# Patient Record
Sex: FEMALE | Employment: OTHER | URBAN - METROPOLITAN AREA
[De-identification: names, ages, dates, MRNs, and addresses within clinical notes are randomized per-mention and may not be internally consistent; named-entity substitution may affect disease eponyms.]

---

## 2018-05-13 ENCOUNTER — APPOINTMENT (OUTPATIENT)
Dept: GENERAL RADIOLOGY | Age: 83
End: 2018-05-13
Attending: PHYSICIAN ASSISTANT
Payer: MEDICARE

## 2018-05-13 ENCOUNTER — HOSPITAL ENCOUNTER (EMERGENCY)
Age: 83
Discharge: HOME | End: 2018-05-13
Attending: EMERGENCY MEDICINE
Payer: MEDICARE

## 2018-05-13 VITALS
SYSTOLIC BLOOD PRESSURE: 146 MMHG | BODY MASS INDEX: 17.4 KG/M2 | OXYGEN SATURATION: 100 % | TEMPERATURE: 97.6 F | HEIGHT: 60 IN | RESPIRATION RATE: 18 BRPM | WEIGHT: 88.63 LBS | HEART RATE: 79 BPM | DIASTOLIC BLOOD PRESSURE: 103 MMHG

## 2018-05-13 DIAGNOSIS — M47.817 LUMBAR AND SACRAL ARTHRITIS: Primary | ICD-10-CM

## 2018-05-13 DIAGNOSIS — M25.552 LEFT HIP PAIN: ICD-10-CM

## 2018-05-13 DIAGNOSIS — M16.10 HIP ARTHRITIS: ICD-10-CM

## 2018-05-13 PROCEDURE — 73502 X-RAY EXAM HIP UNI 2-3 VIEWS: CPT

## 2018-05-13 PROCEDURE — 74011250637 HC RX REV CODE- 250/637: Performed by: PHYSICIAN ASSISTANT

## 2018-05-13 PROCEDURE — 99283 EMERGENCY DEPT VISIT LOW MDM: CPT

## 2018-05-13 RX ORDER — HYDROCODONE BITARTRATE AND ACETAMINOPHEN 5; 325 MG/1; MG/1
1 TABLET ORAL
Status: COMPLETED | OUTPATIENT
Start: 2018-05-13 | End: 2018-05-13

## 2018-05-13 RX ORDER — HYDROCODONE BITARTRATE AND ACETAMINOPHEN 5; 325 MG/1; MG/1
1 TABLET ORAL
Qty: 12 TAB | Refills: 0 | Status: SHIPPED | OUTPATIENT
Start: 2018-05-13

## 2018-05-13 RX ADMIN — HYDROCODONE BITARTRATE AND ACETAMINOPHEN 1 TABLET: 5; 325 TABLET ORAL at 12:58

## 2018-05-13 NOTE — ED PROVIDER NOTES
EMERGENCY DEPARTMENT HISTORY AND PHYSICAL EXAM      Date: 5/13/2018  Patient Name: Darren Hunter    History of Presenting Illness     Chief Complaint   Patient presents with    Tailbone Pain     x one week; denies pain       History Provided By: Patient    HPI: Darren Hunter, [de-identified] y.o. female with PMHx significant for sarcoidosis who presents ambulatory to the ED with cc of gradually worsening sharp left hip pain  X 1 week. Pt reports associated low grade fever that occurred last night. She reports taking motrin with relief of her fever symptoms. Pt denies any recent falls or injuries. Son reports a PMHx significant for sarcoidosis noting that pt was treated with prednisone x 40 + years. Pt reports a PSHx significant for hernia repair that occurred in October 2017. She denies any numbness, weakness, joint swelling, nausea, vomiting, fever, chills, or HA. There are no other complaints, changes, or physical findings at this time. PCP: None        Past History     Past Medical History:  Past Medical History:   Diagnosis Date    Sarcoidosis        Past Surgical History:  Past Surgical History:   Procedure Laterality Date    HX HERNIA REPAIR         Family History:  History reviewed. No pertinent family history. Social History:  Social History   Substance Use Topics    Smoking status: Former Smoker    Smokeless tobacco: Never Used    Alcohol use No       Allergies: Allergies   Allergen Reactions    Penicillin G Other (comments)         Review of Systems   Review of Systems   Constitutional: Negative for fatigue and fever. HENT: Negative for ear pain and sore throat. Eyes: Negative for pain, redness and visual disturbance. Respiratory: Negative for cough and shortness of breath. Cardiovascular: Negative for chest pain and palpitations. Gastrointestinal: Negative for abdominal pain, nausea and vomiting. Genitourinary: Negative for dysuria, frequency and urgency.    Musculoskeletal: Positive for arthralgias (left). Negative for back pain, gait problem, joint swelling, neck pain and neck stiffness. Skin: Negative for rash and wound. Neurological: Negative for dizziness, weakness, light-headedness, numbness and headaches. Physical Exam   Physical Exam   Constitutional: She is oriented to person, place, and time. She appears well-developed and well-nourished. Non-toxic appearance. No distress. Thin   HENT:   Head: Normocephalic and atraumatic. Right Ear: External ear normal.   Left Ear: External ear normal.   Nose: Nose normal.   Mouth/Throat: Uvula is midline. No trismus in the jaw. Eyes: Conjunctivae and EOM are normal. Pupils are equal, round, and reactive to light. No scleral icterus. Neck: Normal range of motion and full passive range of motion without pain. Cardiovascular: Normal rate and regular rhythm. Pulmonary/Chest: Effort normal. No accessory muscle usage. No tachypnea. No respiratory distress. She has no decreased breath sounds. She has no wheezes. Abdominal: Soft. There is no tenderness. Musculoskeletal: Normal range of motion. Left hip: able to flex hip and knee > 90 degrees, no bruising or redness. Lateral hip pain. Neurological: She is alert and oriented to person, place, and time. She is not disoriented. No cranial nerve deficit. GCS eye subscore is 4. GCS verbal subscore is 5. GCS motor subscore is 6. Skin: Skin is intact. No rash noted. Psychiatric: She has a normal mood and affect. Her speech is normal.   Nursing note and vitals reviewed. Diagnostic Study Results     Radiologic Studies -   XR HIP LT W OR WO PELV 2-3 VWS   Final Result   EXAM:  Left hip, 2 views     INDICATION:   Left hip pain     COMPARISON: None.     FINDINGS: An AP view of the pelvis and a frogleg lateral view of the left hip  demonstrate no acute fracture or dislocation.  Severe lower lumbar, moderate  bilateral SI joint and mild bilateral hip joint degenerative changes are shown. There are also mild degenerative changes at the symphysis pubis bilaterally.       IMPRESSION  IMPRESSION: No acute fracture or dislocation demonstrated. Degenerative  findings. .     Medical Decision Making   I am the first provider for this patient. I reviewed the vital signs, available nursing notes, past medical history, past surgical history, family history and social history. Vital Signs-Reviewed the patient's vital signs. Patient Vitals for the past 12 hrs:   Temp Pulse Resp BP SpO2   05/13/18 1206 97.6 °F (36.4 °C) 79 18 (!) 146/103 100 %     Records Reviewed: Nursing Notes and Old Medical Records    Provider Notes (Medical Decision Making):     Sprain, strain, fracture    ED Course:   Initial assessment performed. The patients presenting problems have been discussed, and they are in agreement with the care plan formulated and outlined with them. I have encouraged them to ask questions as they arise throughout their visit. Disposition:    DISCHARGE NOTE  1:34 PM  The patient has been re-evaluated and is ready for discharge. Reviewed available results with patient. Counseled patient on diagnosis and care plan. Patient has expressed understanding, and all questions have been answered. Patient agrees with plan and agrees to follow up as recommended, or return to the ED if their symptoms worsen. Discharge instructions have been provided and explained to the patient, along with reasons to return to the ED. PLAN:  1. Discharge Medication List as of 5/13/2018  1:34 PM      START taking these medications    Details   HYDROcodone-acetaminophen (NORCO) 5-325 mg per tablet Take 1 Tab by mouth every four (4) hours as needed for Pain. Max Daily Amount: 6 Tabs., Print, Disp-12 Tab, R-0           2.    Follow-up Information     Follow up With Details Comments Loida Virgen MD  ORTHO: call to schedule follow up [address] [facility]  [address]  [address] [address]  [phone]          Return to ED if worse     Diagnosis     Clinical Impression:   1. Lumbar and sacral arthritis    2. Hip arthritis    3. Left hip pain        Attestations: This note is prepared by Chilo Guerra, acting as Scribe for Nadia Bullard. JOZEF Quintero: The scribe's documentation has been prepared under my direction and personally reviewed by me in its entirety. I confirm that the note above accurately reflects all work, treatment, procedures, and medical decision making performed by me.

## 2018-05-13 NOTE — DISCHARGE INSTRUCTIONS
[facility] SYSTEMS Departments     For adult and child immunizations, family planning, TB screening, STD testing and women's health services. [facility]: [place] [phone]      [facility] Karla D 25   [address]   [address]   170 [facility]: [place] [address] [phone]      [address]          [address] [address]     For primary care services, woman and child wellness, and some clinics providing specialty care. [facility] -- [address] 2525 [facility] [phone]   411 [facility] 200 [facility] 3614 [facility] [phone]   [address] [facility] [address] [facility] [address] [phone] [address]  [employer] [address] [place] [address] 5850  Community  [phone]   [address] [place] [address] [address] [address] [phone]   [facility] 81 [facility] [phone]   Lisa Lorenzo [facility] 10[address] [facility] [phone]   [facility]: [facility] [address], [address] [place] [address], [address] [phone]     [place] [address] [facility] [address] [phone]   [facility] Out[facility] 5850 [facility]  [phone]   [employer]  [address], [address] ([email or web address]) [phone]         Sexual Health/Woman Wellness Clinics    For STD/HIV testing and treatment, pregnancy testing and services, men's health, birth control services, LGBT services, and hepatitis/HPV vaccine services. [employer] for [place] [employer] [address] [facility] [address] [facility] [address] [facility] 15[address] [phone]   [facility] [address], 5th floor [phone]   Pregnancy [address] [facility] JAYME Barney [phone]         Specialty Service 0528 [facility]   [phone]   [place]   [phone]   Women, Infant and Children's Services: [address] [phone] 600 [facility]   [phone]   [address]   3940 [facility]     [phone]   [address] Crisis   [address] [facility] [address] [phone]     Local Primary Care Physicians  [facility] [phone]  MD Rere Gupta MD Grace Abler, MD [facility] [phone]  Jose D Bull, P  MD Mekhi Ji MD Marie Castilla, MD [address] [address] [phone]  MD Quinten Allen MD [address] [facility] [phone]  MD Natalya Perez MD Cain Jakes, MD Stevenson Coots, MD   [facility] [phone]  [facility] OFFNJW LEONARD, MD Simon García, NP [address] [phone]  MD Pratima Powell, MD Maria Teresa Abdi MD Eusebio Pam, MD Darel Land, MD Syliva Murphy, MD   33 57 [facility]  Arnel Riley MD [address] [phone]  Aníbal Peguero, MD Angy Eng, NP  MD Arcadio Hitchcock, MD Kimberly Mehta MD Ileene Hazel, MD   85[address] [facility] [phone]  Argelia Francis, MD Jordyn Givens, FNP  Apolinar Gandhi, MD Ronald Burns MD Wardell Brothers, MD Yuliya Reddy MD [facility] [phone]  Joyce Chavira MD  Charm Einstein, MD Maddie Phelps MD Elwanda Remedies, MD   [address] [phone]  MD Yudi Restrepo MD [place] [phone]  MD Katelynn Hodges MD Suzanne Grego Lieutenant Abreu, [address] [facility] [phone]  Josh Day, MD Sinai Houser, MD Jacklyn Bishop, MD Issa Ross, MD Cooper Rich, MD Jim Cotton, NICHOLE Marquez MD [address]   [phone]  Galen Katz, MD Randy Osler, MD Zion Lantigua MD   2102 [facility] [phone]  Roberto Carlos Cornelius, MD Chelsey Jett, ALEX Olson, JOZEF Olson, FREDERICK Thakkar, JOZEF Valenzuela, MD Jossie Lau, NP   Julia Springer, DO Miscellaneous:  Nikki Khoury [phone]

## 2020-01-27 ENCOUNTER — HOSPITAL ENCOUNTER (INPATIENT)
Age: 85
LOS: 4 days | Discharge: HOME HEALTH | DRG: 392 | End: 2020-02-01
Attending: EMERGENCY MEDICINE | Admitting: INTERNAL MEDICINE
Payer: MEDICARE

## 2020-01-27 DIAGNOSIS — R19.7 VOMITING AND DIARRHEA: Primary | ICD-10-CM

## 2020-01-27 DIAGNOSIS — N39.0 URINARY TRACT INFECTION WITHOUT HEMATURIA, SITE UNSPECIFIED: ICD-10-CM

## 2020-01-27 DIAGNOSIS — R11.10 VOMITING AND DIARRHEA: Primary | ICD-10-CM

## 2020-01-27 LAB
ALBUMIN SERPL-MCNC: 4.1 G/DL (ref 3.5–5)
ALBUMIN/GLOB SERPL: 1 {RATIO} (ref 1.1–2.2)
ALP SERPL-CCNC: 74 U/L (ref 45–117)
ALT SERPL-CCNC: 25 U/L (ref 12–78)
ANION GAP SERPL CALC-SCNC: 6 MMOL/L (ref 5–15)
AST SERPL-CCNC: 31 U/L (ref 15–37)
BILIRUB SERPL-MCNC: 0.6 MG/DL (ref 0.2–1)
BUN SERPL-MCNC: 24 MG/DL (ref 6–20)
BUN/CREAT SERPL: 17 (ref 12–20)
CALCIUM SERPL-MCNC: 9.3 MG/DL (ref 8.5–10.1)
CHLORIDE SERPL-SCNC: 109 MMOL/L (ref 97–108)
CO2 SERPL-SCNC: 27 MMOL/L (ref 21–32)
CREAT SERPL-MCNC: 1.41 MG/DL (ref 0.55–1.02)
GLOBULIN SER CALC-MCNC: 4 G/DL (ref 2–4)
GLUCOSE SERPL-MCNC: 102 MG/DL (ref 65–100)
POTASSIUM SERPL-SCNC: 3.9 MMOL/L (ref 3.5–5.1)
PROT SERPL-MCNC: 8.1 G/DL (ref 6.4–8.2)
SODIUM SERPL-SCNC: 142 MMOL/L (ref 136–145)

## 2020-01-27 PROCEDURE — 99285 EMERGENCY DEPT VISIT HI MDM: CPT

## 2020-01-27 PROCEDURE — 84484 ASSAY OF TROPONIN QUANT: CPT

## 2020-01-27 PROCEDURE — 83690 ASSAY OF LIPASE: CPT

## 2020-01-27 PROCEDURE — 96374 THER/PROPH/DIAG INJ IV PUSH: CPT

## 2020-01-27 PROCEDURE — 85025 COMPLETE CBC W/AUTO DIFF WBC: CPT

## 2020-01-27 PROCEDURE — 80053 COMPREHEN METABOLIC PANEL: CPT

## 2020-01-27 PROCEDURE — 36415 COLL VENOUS BLD VENIPUNCTURE: CPT

## 2020-01-27 PROCEDURE — 96375 TX/PRO/DX INJ NEW DRUG ADDON: CPT

## 2020-01-27 PROCEDURE — 82550 ASSAY OF CK (CPK): CPT

## 2020-01-27 PROCEDURE — 96361 HYDRATE IV INFUSION ADD-ON: CPT

## 2020-01-27 RX ORDER — FAMOTIDINE 10 MG/ML
20 INJECTION INTRAVENOUS
Status: DISCONTINUED | OUTPATIENT
Start: 2020-01-27 | End: 2020-01-27

## 2020-01-27 RX ORDER — ONDANSETRON 2 MG/ML
4 INJECTION INTRAMUSCULAR; INTRAVENOUS
Status: COMPLETED | OUTPATIENT
Start: 2020-01-27 | End: 2020-01-28

## 2020-01-28 ENCOUNTER — APPOINTMENT (OUTPATIENT)
Dept: CT IMAGING | Age: 85
DRG: 392 | End: 2020-01-28
Attending: EMERGENCY MEDICINE
Payer: MEDICARE

## 2020-01-28 ENCOUNTER — APPOINTMENT (OUTPATIENT)
Dept: GENERAL RADIOLOGY | Age: 85
DRG: 392 | End: 2020-01-28
Attending: SURGERY
Payer: MEDICARE

## 2020-01-28 PROBLEM — R19.7 DIARRHEA: Status: ACTIVE | Noted: 2020-01-28

## 2020-01-28 LAB
APPEARANCE UR: ABNORMAL
ATRIAL RATE: 101 BPM
BACTERIA URNS QL MICRO: ABNORMAL /HPF
BASOPHILS # BLD: 0 K/UL (ref 0–0.1)
BASOPHILS NFR BLD: 0 % (ref 0–1)
BILIRUB UR QL: NEGATIVE
C DIFF GDH STL QL: NEGATIVE
C DIFF TOX A+B STL QL IA: NEGATIVE
CALCULATED P AXIS, ECG09: 60 DEGREES
CALCULATED R AXIS, ECG10: 12 DEGREES
CALCULATED T AXIS, ECG11: 1 DEGREES
CAMPYLOBACTER SPECIES, DNA: NEGATIVE
CK SERPL-CCNC: 151 U/L (ref 26–192)
COLOR UR: ABNORMAL
DIAGNOSIS, 93000: NORMAL
DIFFERENTIAL METHOD BLD: ABNORMAL
ENTEROTOXIGEN E COLI, DNA: NEGATIVE
EOSINOPHIL # BLD: 0 K/UL (ref 0–0.4)
EOSINOPHIL NFR BLD: 0 % (ref 0–7)
EPITH CASTS URNS QL MICRO: ABNORMAL /LPF
ERYTHROCYTE [DISTWIDTH] IN BLOOD BY AUTOMATED COUNT: 13 % (ref 11.5–14.5)
GLUCOSE UR STRIP.AUTO-MCNC: NEGATIVE MG/DL
HCT VFR BLD AUTO: 38.4 % (ref 35–47)
HGB BLD-MCNC: 12 G/DL (ref 11.5–16)
HGB UR QL STRIP: ABNORMAL
IMM GRANULOCYTES # BLD AUTO: 0.1 K/UL (ref 0–0.04)
IMM GRANULOCYTES NFR BLD AUTO: 1 % (ref 0–0.5)
INTERPRETATION: NORMAL
KETONES UR QL STRIP.AUTO: NEGATIVE MG/DL
LACTATE SERPL-SCNC: 1.8 MMOL/L (ref 0.4–2)
LACTATE SERPL-SCNC: 2.1 MMOL/L (ref 0.4–2)
LACTATE SERPL-SCNC: 2.9 MMOL/L (ref 0.4–2)
LACTATE SERPL-SCNC: 3.2 MMOL/L (ref 0.4–2)
LEUKOCYTE ESTERASE UR QL STRIP.AUTO: ABNORMAL
LIPASE SERPL-CCNC: 337 U/L (ref 73–393)
LYMPHOCYTES # BLD: 0.7 K/UL (ref 0.8–3.5)
LYMPHOCYTES NFR BLD: 6 % (ref 12–49)
MCH RBC QN AUTO: 30.5 PG (ref 26–34)
MCHC RBC AUTO-ENTMCNC: 31.3 G/DL (ref 30–36.5)
MCV RBC AUTO: 97.7 FL (ref 80–99)
MONOCYTES # BLD: 0.2 K/UL (ref 0–1)
MONOCYTES NFR BLD: 2 % (ref 5–13)
NEUTS SEG # BLD: 9.9 K/UL (ref 1.8–8)
NEUTS SEG NFR BLD: 91 % (ref 32–75)
NITRITE UR QL STRIP.AUTO: NEGATIVE
NRBC # BLD: 0 K/UL (ref 0–0.01)
NRBC BLD-RTO: 0 PER 100 WBC
P SHIGELLOIDES DNA STL QL NAA+PROBE: NEGATIVE
P-R INTERVAL, ECG05: 180 MS
PH UR STRIP: 6 [PH] (ref 5–8)
PLATELET # BLD AUTO: 224 K/UL (ref 150–400)
PMV BLD AUTO: 10.4 FL (ref 8.9–12.9)
PROT UR STRIP-MCNC: NEGATIVE MG/DL
Q-T INTERVAL, ECG07: 336 MS
QRS DURATION, ECG06: 62 MS
QTC CALCULATION (BEZET), ECG08: 435 MS
RBC # BLD AUTO: 3.93 M/UL (ref 3.8–5.2)
RBC #/AREA URNS HPF: ABNORMAL /HPF (ref 0–5)
RBC MORPH BLD: ABNORMAL
SALMONELLA SPECIES, DNA: NEGATIVE
SHIGA TOXIN PRODUCING, DNA: NEGATIVE
SHIGELLA SP+EIEC IPAH STL QL NAA+PROBE: NEGATIVE
SP GR UR REFRACTOMETRY: 1.02 (ref 1–1.03)
TROPONIN I SERPL-MCNC: <0.05 NG/ML
UA: UC IF INDICATED,UAUC: ABNORMAL
UROBILINOGEN UR QL STRIP.AUTO: 0.2 EU/DL (ref 0.2–1)
VENTRICULAR RATE, ECG03: 101 BPM
VIBRIO SPECIES, DNA: NEGATIVE
WBC # BLD AUTO: 10.9 K/UL (ref 3.6–11)
WBC URNS QL MICRO: ABNORMAL /HPF (ref 0–4)
Y. ENTEROCOLITICA, DNA: NEGATIVE

## 2020-01-28 PROCEDURE — 74011250636 HC RX REV CODE- 250/636: Performed by: INTERNAL MEDICINE

## 2020-01-28 PROCEDURE — 0107U C DIFF TOX AG DETCJ IA STOOL: CPT

## 2020-01-28 PROCEDURE — 83605 ASSAY OF LACTIC ACID: CPT

## 2020-01-28 PROCEDURE — 74018 RADEX ABDOMEN 1 VIEW: CPT

## 2020-01-28 PROCEDURE — 87186 SC STD MICRODIL/AGAR DIL: CPT

## 2020-01-28 PROCEDURE — 74011000258 HC RX REV CODE- 258: Performed by: EMERGENCY MEDICINE

## 2020-01-28 PROCEDURE — 93005 ELECTROCARDIOGRAM TRACING: CPT

## 2020-01-28 PROCEDURE — 77030019563 HC DEV ATTCH FEED HOLL -A

## 2020-01-28 PROCEDURE — 74011000258 HC RX REV CODE- 258: Performed by: INTERNAL MEDICINE

## 2020-01-28 PROCEDURE — 74011250636 HC RX REV CODE- 250/636: Performed by: HOSPITALIST

## 2020-01-28 PROCEDURE — 74011636320 HC RX REV CODE- 636/320: Performed by: EMERGENCY MEDICINE

## 2020-01-28 PROCEDURE — 94760 N-INVAS EAR/PLS OXIMETRY 1: CPT

## 2020-01-28 PROCEDURE — 74011250637 HC RX REV CODE- 250/637: Performed by: INTERNAL MEDICINE

## 2020-01-28 PROCEDURE — 74011000250 HC RX REV CODE- 250: Performed by: EMERGENCY MEDICINE

## 2020-01-28 PROCEDURE — 74011250636 HC RX REV CODE- 250/636: Performed by: EMERGENCY MEDICINE

## 2020-01-28 PROCEDURE — 87077 CULTURE AEROBIC IDENTIFY: CPT

## 2020-01-28 PROCEDURE — 36415 COLL VENOUS BLD VENIPUNCTURE: CPT

## 2020-01-28 PROCEDURE — 87086 URINE CULTURE/COLONY COUNT: CPT

## 2020-01-28 PROCEDURE — 77030008771 HC TU NG SALEM SUMP -A

## 2020-01-28 PROCEDURE — 87506 IADNA-DNA/RNA PROBE TQ 6-11: CPT

## 2020-01-28 PROCEDURE — 81001 URINALYSIS AUTO W/SCOPE: CPT

## 2020-01-28 PROCEDURE — 65660000000 HC RM CCU STEPDOWN

## 2020-01-28 PROCEDURE — 77010033678 HC OXYGEN DAILY

## 2020-01-28 PROCEDURE — 74177 CT ABD & PELVIS W/CONTRAST: CPT

## 2020-01-28 RX ORDER — MORPHINE SULFATE 2 MG/ML
2 INJECTION, SOLUTION INTRAMUSCULAR; INTRAVENOUS
Status: DISCONTINUED | OUTPATIENT
Start: 2020-01-28 | End: 2020-02-01 | Stop reason: HOSPADM

## 2020-01-28 RX ORDER — LEVOFLOXACIN 5 MG/ML
250 INJECTION, SOLUTION INTRAVENOUS
Status: DISCONTINUED | OUTPATIENT
Start: 2020-01-30 | End: 2020-01-29

## 2020-01-28 RX ORDER — SODIUM CHLORIDE 0.9 % (FLUSH) 0.9 %
10 SYRINGE (ML) INJECTION
Status: COMPLETED | OUTPATIENT
Start: 2020-01-28 | End: 2020-01-28

## 2020-01-28 RX ORDER — LEVOFLOXACIN 5 MG/ML
500 INJECTION, SOLUTION INTRAVENOUS ONCE
Status: COMPLETED | OUTPATIENT
Start: 2020-01-28 | End: 2020-01-28

## 2020-01-28 RX ORDER — SODIUM CHLORIDE 900 MG/100ML
INJECTION INTRAVENOUS
Status: DISPENSED
Start: 2020-01-28 | End: 2020-01-28

## 2020-01-28 RX ORDER — SODIUM CHLORIDE 0.9 % (FLUSH) 0.9 %
5-40 SYRINGE (ML) INJECTION AS NEEDED
Status: DISCONTINUED | OUTPATIENT
Start: 2020-01-28 | End: 2020-02-01 | Stop reason: HOSPADM

## 2020-01-28 RX ORDER — METRONIDAZOLE 500 MG/100ML
500 INJECTION, SOLUTION INTRAVENOUS EVERY 12 HOURS
Status: DISCONTINUED | OUTPATIENT
Start: 2020-01-28 | End: 2020-01-31

## 2020-01-28 RX ORDER — FENTANYL CITRATE 50 UG/ML
25 INJECTION, SOLUTION INTRAMUSCULAR; INTRAVENOUS
Status: COMPLETED | OUTPATIENT
Start: 2020-01-28 | End: 2020-01-28

## 2020-01-28 RX ORDER — HEPARIN SODIUM 5000 [USP'U]/ML
5000 INJECTION, SOLUTION INTRAVENOUS; SUBCUTANEOUS EVERY 12 HOURS
Status: DISCONTINUED | OUTPATIENT
Start: 2020-01-28 | End: 2020-02-01 | Stop reason: HOSPADM

## 2020-01-28 RX ORDER — LIDOCAINE HYDROCHLORIDE 20 MG/ML
15 SOLUTION OROPHARYNGEAL AS NEEDED
Status: DISCONTINUED | OUTPATIENT
Start: 2020-01-28 | End: 2020-02-01 | Stop reason: HOSPADM

## 2020-01-28 RX ORDER — FACIAL-BODY WIPES
10 EACH TOPICAL DAILY PRN
Status: DISCONTINUED | OUTPATIENT
Start: 2020-01-28 | End: 2020-02-01 | Stop reason: HOSPADM

## 2020-01-28 RX ORDER — ONDANSETRON 2 MG/ML
4 INJECTION INTRAMUSCULAR; INTRAVENOUS
Status: DISCONTINUED | OUTPATIENT
Start: 2020-01-28 | End: 2020-02-01 | Stop reason: HOSPADM

## 2020-01-28 RX ORDER — SODIUM CHLORIDE 0.9 % (FLUSH) 0.9 %
5-40 SYRINGE (ML) INJECTION EVERY 8 HOURS
Status: DISCONTINUED | OUTPATIENT
Start: 2020-01-28 | End: 2020-02-01 | Stop reason: HOSPADM

## 2020-01-28 RX ORDER — DEXTROSE MONOHYDRATE AND SODIUM CHLORIDE 5; .9 G/100ML; G/100ML
150 INJECTION, SOLUTION INTRAVENOUS CONTINUOUS
Status: DISCONTINUED | OUTPATIENT
Start: 2020-01-28 | End: 2020-01-29

## 2020-01-28 RX ORDER — ACETAMINOPHEN 650 MG/1
650 SUPPOSITORY RECTAL
Status: DISCONTINUED | OUTPATIENT
Start: 2020-01-28 | End: 2020-01-31

## 2020-01-28 RX ADMIN — Medication 10 ML: at 23:50

## 2020-01-28 RX ADMIN — SODIUM CHLORIDE 1000 ML: 900 INJECTION, SOLUTION INTRAVENOUS at 09:03

## 2020-01-28 RX ADMIN — Medication 10 ML: at 00:40

## 2020-01-28 RX ADMIN — METRONIDAZOLE 500 MG: 500 INJECTION, SOLUTION INTRAVENOUS at 23:50

## 2020-01-28 RX ADMIN — FENTANYL CITRATE 25 MCG: 50 INJECTION, SOLUTION INTRAMUSCULAR; INTRAVENOUS at 02:34

## 2020-01-28 RX ADMIN — CEFTRIAXONE 1 G: 1 INJECTION, POWDER, FOR SOLUTION INTRAMUSCULAR; INTRAVENOUS at 03:58

## 2020-01-28 RX ADMIN — IOPAMIDOL 70 ML: 755 INJECTION, SOLUTION INTRAVENOUS at 00:40

## 2020-01-28 RX ADMIN — METRONIDAZOLE 500 MG: 500 INJECTION, SOLUTION INTRAVENOUS at 09:40

## 2020-01-28 RX ADMIN — FAMOTIDINE 20 MG: 10 INJECTION, SOLUTION INTRAVENOUS at 00:55

## 2020-01-28 RX ADMIN — ONDANSETRON 4 MG: 2 INJECTION INTRAMUSCULAR; INTRAVENOUS at 00:52

## 2020-01-28 RX ADMIN — Medication 10 ML: at 14:12

## 2020-01-28 RX ADMIN — MORPHINE SULFATE 2 MG: 2 INJECTION, SOLUTION INTRAMUSCULAR; INTRAVENOUS at 23:50

## 2020-01-28 RX ADMIN — LEVOFLOXACIN 500 MG: 5 INJECTION, SOLUTION INTRAVENOUS at 09:02

## 2020-01-28 RX ADMIN — HEPARIN SODIUM 5000 UNITS: 5000 INJECTION INTRAVENOUS; SUBCUTANEOUS at 09:41

## 2020-01-28 RX ADMIN — HEPARIN SODIUM 5000 UNITS: 5000 INJECTION INTRAVENOUS; SUBCUTANEOUS at 18:42

## 2020-01-28 RX ADMIN — DEXTROSE MONOHYDRATE AND SODIUM CHLORIDE 100 ML/HR: 5; .9 INJECTION, SOLUTION INTRAVENOUS at 08:14

## 2020-01-28 RX ADMIN — ACETAMINOPHEN 650 MG: 650 SUPPOSITORY RECTAL at 09:40

## 2020-01-28 RX ADMIN — SODIUM CHLORIDE 1000 ML: 900 INJECTION, SOLUTION INTRAVENOUS at 00:59

## 2020-01-28 NOTE — CONSULTS
Surgery Consult    Acute care surgery not called this morning from the ER. I have been asked to see. Subjective:   Patient 82 y.o. female presents with fatigue and diarrhea. Has an extensive PSH including Exlap and SBO for perforation and VHR with mesh. Has received recent care at [facility] including a paraesophageal hernia repair. Was evaluated earlier this month for LUQ pain in [place] and had a CT scan on 1/15/20 without any acute findings. Developed worsening diarrhea yesterday and was brought to the ER by ambulance. CT yesterday: IMPRESSION:     1. Significantly dilated, fluid-filled stomach with elevation of left  hemidiaphragm. 2. Mildly dilated, fluid-filled colon suggest diarrhea. No definite obstruction. 3. Possible small gallstones. Denies any pain currently. Diarrhea better. No vomiting. Refused NGT last night.         Past Medical & Surgical History:  Past Medical History:   Diagnosis Date    Arthritis     Perforated bowel ([address]     Sarcoidosis       Past Surgical History:   Procedure Laterality Date    HX HERNIA REPAIR         Social History:  Social History     Socioeconomic History    Marital status:      Spouse name: Not on file    Number of children: Not on file    Years of education: Not on file    Highest education level: Not on file   Occupational History    Not on file   Social Needs    Financial resource strain: Not on file    Food insecurity:     Worry: Not on file     Inability: Not on file    Transportation needs:     Medical: Not on file     Non-medical: Not on file   Tobacco Use    Smoking status: Former Smoker    Smokeless tobacco: Never Used   Substance and Sexual Activity    Alcohol use: No    Drug use: Not on file    Sexual activity: Not on file   Lifestyle    Physical activity:     Days per week: Not on file     Minutes per session: Not on file    Stress: Not on file   Relationships    Social connections:     Talks on phone: Not on file Gets together: Not on file     Attends [employer] service: Not on file     Active member of club or organization: Not on file     Attends meetings of clubs or organizations: Not on file     Relationship status: Not on file    Intimate partner violence:     Fear of current or ex partner: Not on file     Emotionally abused: Not on file     Physically abused: Not on file     Forced sexual activity: Not on file   Other Topics Concern    Not on file   Social History Narrative    Not on file        Family History:  No family history on file. Prior to Admission Medications:  Prior to Admission Medications   Prescriptions Last Dose Informant Patient Reported? Taking? HYDROcodone-acetaminophen (NORCO) 5-325 mg per tablet Not Taking at Unknown time  No No   Sig: Take 1 Tab by mouth every four (4) hours as needed for Pain. Max Daily Amount: 6 Tabs. Facility-Administered Medications: None       Allergies: Allergies   Allergen Reactions    Penicillin G Other (comments)       Review of Systems  A comprehensive review of systems was negative except for that written in the HPI. Objective:     Exam:    Visit Vitals  /65 (BP 1 Location: Left arm, BP Patient Position: At rest)   Pulse 88   Temp 98 °F (36.7 °C)   Resp 20   Ht 5' (1.524 m)   Wt 39.5 kg (87 lb)   SpO2 100%   Breastfeeding No   BMI 16.99 kg/m²     General appearance: alert, fatigued, no distress, appears stated age  Unhappy about being her. \"I am going to die\"  Head: Normocephalic, without obvious abnormality, atraumatic  Neck: supple, symmetrical, trachea midline, no adenopathy, thyroid: not enlarged, symmetric, no tenderness/mass/nodules, no carotid bruit and no JVD  Lungs: clear to auscultation bilaterally  Heart: regular rate and rhythm, S1, S2 normal, no murmur, click, rub or gallop  Abdomen: soft, mild lower abdominal tenderness.  Bowel sounds normal. No masses,  no organomegaly  Extremities: extremities normal, atraumatic, no cyanosis or edema  Skin: Skin color, texture, turgor normal. No rashes or lesions      Data Review  Recent Results (from the past 24 hour(s))   CBC WITH AUTOMATED DIFF    Collection Time: 01/27/20 11:02 PM   Result Value Ref Range    WBC 10.9 3.6 - 11.0 K/uL    RBC 3.93 3.80 - 5.20 M/uL    HGB 12.0 11.5 - 16.0 g/dL    HCT 38.4 35.0 - 47.0 %    MCV 97.7 80.0 - 99.0 FL    MCH 30.5 26.0 - 34.0 PG    MCHC 31.3 30.0 - 36.5 g/dL    RDW 13.0 11.5 - 14.5 %    PLATELET 570 314 - 148 K/uL    MPV 10.4 8.9 - 12.9 FL    NRBC 0.0 0  WBC    ABSOLUTE NRBC 0.00 0.00 - 0.01 K/uL    NEUTROPHILS 91 (H) 32 - 75 %    LYMPHOCYTES 6 (L) 12 - 49 %    MONOCYTES 2 (L) 5 - 13 %    EOSINOPHILS 0 0 - 7 %    BASOPHILS 0 0 - 1 %    IMMATURE GRANULOCYTES 1 (H) 0.0 - 0.5 %    ABS. NEUTROPHILS 9.9 (H) 1.8 - 8.0 K/UL    ABS. LYMPHOCYTES 0.7 (L) 0.8 - 3.5 K/UL    ABS. MONOCYTES 0.2 0.0 - 1.0 K/UL    ABS. EOSINOPHILS 0.0 0.0 - 0.4 K/UL    ABS. BASOPHILS 0.0 0.0 - 0.1 K/UL    ABS. IMM. GRANS. 0.1 (H) 0.00 - 0.04 K/UL    DF AUTOMATED      RBC COMMENTS NORMOCYTIC, NORMOCHROMIC     METABOLIC PANEL, COMPREHENSIVE    Collection Time: 01/27/20 11:02 PM   Result Value Ref Range    Sodium 142 136 - 145 mmol/L    Potassium 3.9 3.5 - 5.1 mmol/L    Chloride 109 (H) 97 - 108 mmol/L    CO2 27 21 - 32 mmol/L    Anion gap 6 5 - 15 mmol/L    Glucose 102 (H) 65 - 100 mg/dL    BUN 24 (H) 6 - 20 MG/DL    Creatinine 1.41 (H) 0.55 - 1.02 MG/DL    BUN/Creatinine ratio 17 12 - 20      GFR est AA 43 (L) >60 ml/min/1.73m2    GFR est non-AA 36 (L) >60 ml/min/1.73m2    Calcium 9.3 8.5 - 10.1 MG/DL    Bilirubin, total 0.6 0.2 - 1.0 MG/DL    ALT (SGPT) 25 12 - 78 U/L    AST (SGOT) 31 15 - 37 U/L    Alk.  phosphatase 74 45 - 117 U/L    Protein, total 8.1 6.4 - 8.2 g/dL    Albumin 4.1 3.5 - 5.0 g/dL    Globulin 4.0 2.0 - 4.0 g/dL    A-G Ratio 1.0 (L) 1.1 - 2.2     LIPASE    Collection Time: 01/27/20 11:02 PM   Result Value Ref Range    Lipase 337 73 - 393 U/L   CK    Collection Time: 01/27/20 11:02 PM   Result Value Ref Range     26 - 192 U/L   TROPONIN I    Collection Time: 01/27/20 11:02 PM   Result Value Ref Range    Troponin-I, Qt. <0.05 <0.05 ng/mL   LACTIC ACID    Collection Time: 01/28/20 12:51 AM   Result Value Ref Range    Lactic acid 2.1 (HH) 0.4 - 2.0 MMOL/L   EKG, 12 LEAD, INITIAL    Collection Time: 01/28/20  1:08 AM   Result Value Ref Range    Ventricular Rate 101 BPM    Atrial Rate 101 BPM    P-R Interval 180 ms    QRS Duration 62 ms    Q-T Interval 336 ms    QTC Calculation (Bezet) 435 ms    Calculated P Axis 60 degrees    Calculated R Axis 12 degrees    Calculated T Axis 1 degrees    Diagnosis       Sinus tachycardia  Low voltage QRS  Early rs transition    No previous ECGs available  Confirmed by Don Loss ([id number]) on 1/28/2020 10:31:32 AM     URINALYSIS W/ REFLEX CULTURE    Collection Time: 01/28/20  1:20 AM   Result Value Ref Range    Color YELLOW/STRAW      Appearance CLOUDY (A) CLEAR      Specific gravity 1.021 1.003 - 1.030      pH (UA) 6.0 5.0 - 8.0      Protein NEGATIVE  NEG mg/dL    Glucose NEGATIVE  NEG mg/dL    Ketone NEGATIVE  NEG mg/dL    Bilirubin NEGATIVE  NEG      Blood TRACE (A) NEG      Urobilinogen 0.2 0.2 - 1.0 EU/dL    Nitrites NEGATIVE  NEG      Leukocyte Esterase SMALL (A) NEG      WBC 5-10 0 - 4 /hpf    RBC 0-5 0 - 5 /hpf    Epithelial cells FEW FEW /lpf    Bacteria 3+ (A) NEG /hpf    UA:UC IF INDICATED URINE CULTURE ORDERED (A) CNI     LACTIC ACID    Collection Time: 01/28/20  6:25 AM   Result Value Ref Range    Lactic acid 3.2 (HH) 0.4 - 2.0 MMOL/L   LACTIC ACID    Collection Time: 01/28/20 12:10 PM   Result Value Ref Range    Lactic acid 2.9 (HH) 0.4 - 2.0 MMOL/L       Assessment:     Active Problems:    Diarrhea (1/28/2020)        Plan:     Reviewed the CAT scan with Dr. Maritza Yang in radiology. I agree I do not see an obvious obstruction.   She has a very enlarged stomach there is evidence of a partial wrap consistent with her prior paraesophageal hernia repair the stomach appears to be all below the diaphragm. No mass no perforation. She has a fluid-filled colon as well consistent with enteritis. I agree with NGT. I placed it. Some thick output, ?curling in the esophagus. AXR pending. Discussed with patient and daughter in the room. Discussed with Son pediatrician in [place] [phone]    Agree with enteritis workup. No evidence of mechanical obstruction. Thank you for this consult.

## 2020-01-28 NOTE — ED TRIAGE NOTES
Patient was at home when sudden onset of N/V/D approx 1900. Patient is having severe diarrhea with vomiting but at this time dry heaving. Denies any blood in stool but has hx of tear in her intestine. Patient is A&Ox4 at this time.

## 2020-01-28 NOTE — PROGRESS NOTES
* No surgery found *  * No surgery found *  Bedside and Verbal shift change report given to Kerry Rubalcava (oncoming nurse) by Rosalinda Alonzo (offgoing nurse). Report included the following information SBAR, Kardex, Procedure Summary, MAR and Cardiac Rhythm NSR. Zone Phone:   [phone]      Significant changes during shift:  NEW ADMISSION        Patient Information    Radha Reddy  82 y.o.  1/27/2020  9:56 PM by Harry Gamboa MD. Radha Reddy was admitted from Home    Problem List    Patient Active Problem List    Diagnosis Date Noted    Diarrhea 01/28/2020     Past Medical History:   Diagnosis Date    Arthritis     Perfor[address] bowel ([facility] [address]     Sarcoidosis          Core Measures:    CVA: No No  CHF:No No  PNA:No No    Post Op Surgical (If Applicable):     Number times ambulated in hallway past shift:  NO  Number of times OOB to chair past shift:   NO  NG Tube: No  Incentive Spirometer: No  Drains: No   Volume    Dressing Present:  No  Flatus:  Not applicable    Activity Status:    OOB to Chair No  Ambulated this shift No   Bed Rest Yes    Supplemental O2: (If Applicable)    NC No  NRB No  Venti-mask No  On  Liters/min      LINES AND DRAINS:    Central Line? No     PICC LINE? No     Urinary Catheter? No     DVT prophylaxis:    DVT prophylaxis Med- Yes  DVT prophylaxis SCD or MEL- Refused     Wounds: (If Applicable)    Wounds- No    Location     Patient Safety:    Falls Score Total Score: 4  Safety Level_______  Bed Alarm On? Yes  Sitter?  No    Plan for upcoming shift: safety precaution        Discharge Plan: No     Active Consults:  IP CONSULT TO GENERAL SURGERY

## 2020-01-28 NOTE — ED NOTES
Attempted to insert NG tube in left nare per admitting physician's orders but when inserted and met with resistance patient stated too painful. Offered to try right nare but she still declined. Viscous lidocaine was pulled from pyxis and patient used it to gargle back of throat but still no help. Explained risk and benefits of NG tube and why beneficial for her but she still declined. [facility] floor nurse who report was given to informed on failed attempt of NG insertion.

## 2020-01-28 NOTE — PROGRESS NOTES
Report given to [place], RN     Zone Phone: [phone]        Significant changes during shift:  surgical consult, NGT placed for decompression           Patient Information     Ju Rust  82 y.o.  1/27/2020  9:56 PM by Aggie Chino MD. Ju Rust was admitted from Home     Problem List          Patient Active Problem List     Diagnosis Date Noted    Diarrhea 01/28/2020           Past Medical History:   Diagnosis Date    Arthritis      Perforated bowel ([address]      Sarcoidosis              Core Measures:     CVA: No No  CHF:No No  PNA:No No     Post Op Surgical (If Applicable):      Number times ambulated in hallway past shift:  NO  Number of times OOB to chair past shift:   NO  NG Tube: No  Incentive Spirometer: No  Drains: No   Volume    Dressing Present:  No  Flatus:  Not applicable     Activity Status:     OOB to Chair No  Ambulated this shift No   Bed Rest Yes     Supplemental O2: (If Applicable)     NC No  NRB No  Venti-mask No  On  Liters/min        LINES AND DRAINS:     Central Line? No      PICC LINE? No      Urinary Catheter? No      DVT prophylaxis:     DVT prophylaxis Med- Yes  DVT prophylaxis SCD or MEL- Refused      Wounds: (If Applicable)     Wounds- No     Location      Patient Safety:     Falls Score Total Score: 4  Safety Level_______  Bed Alarm On? Yes  Sitter?  No     Plan for upcoming shift: abdominal xray tomorrow      Discharge Plan: probable home when stable     Active Consults:  IP CONSULT TO GENERAL SURGERY

## 2020-01-28 NOTE — ED PROVIDER NOTES
EMERGENCY DEPARTMENT HISTORY AND PHYSICAL EXAM      Date: 1/27/2020  Patient Name: Miguel Clinton    History of Presenting Illness     Chief Complaint   Patient presents with    Diarrhea    Vomiting    Abdominal Pain       History Provided By: Patient    HPI: Miguel Clinton, 82 y.o. female with PMHx significant for sarcoidosis and surgical history significant for laparoscopic abdominal surgery in [facility] clinic in [place] for her \"stomach in the wrong place\" presents to the emergency room with chief complaint of upper abdominal cramping and nausea, vomiting, and diarrhea that started about 630 or 7 tonight. Patient lives alone at home and started having upper abdominal crampy pain that was moderate in intensity and located in the middle of her upper abdomen. She then developed diarrhea and has had 4-5 episodes of loose stools since onset of symptoms. She is also had nausea and one episode of vomiting. She was not able to report whether there was any blood in her emesis or stool. She denies any fevers or chills. She denies any shortness of breath, chest pain, dysuria, hematuria, urinary frequency. PCP: Unknown, Provider    No current facility-administered medications on file prior to encounter. Current Outpatient Medications on File Prior to Encounter   Medication Sig Dispense Refill    HYDROcodone-acetaminophen (NORCO) 5-325 mg per tablet Take 1 Tab by mouth every four (4) hours as needed for Pain. Max Daily Amount: 6 Tabs. 12 Tab 0       Past History     Past Medical History:  Past Medical History:   Diagnosis Date    Sarcoidosis        Past Surgical History:  Past Surgical History:   Procedure Laterality Date    HX HERNIA REPAIR         Family History:  No family history on file.     Social History:  Social History     Tobacco Use    Smoking status: Former Smoker    Smokeless tobacco: Never Used   Substance Use Topics    Alcohol use: No    Drug use: Not on file Allergies: Allergies   Allergen Reactions    Penicillin G Other (comments)         Review of Systems   Review of Systems   Constitutional: Negative for chills and fever. HENT: Negative for congestion, ear pain, rhinorrhea and sore throat. Eyes: Negative. Respiratory: Negative for cough, chest tightness, shortness of breath and wheezing. Cardiovascular: Negative for chest pain, palpitations and leg swelling. Gastrointestinal: Positive for abdominal pain, diarrhea, nausea and vomiting. Negative for blood in stool and constipation. Genitourinary: Negative for dysuria, flank pain, frequency and hematuria. Musculoskeletal: Negative for back pain and myalgias. Skin: Negative for rash and wound. Neurological: Negative for syncope, weakness, light-headedness and headaches. Psychiatric/Behavioral: Negative for confusion. The patient is nervous/anxious.         Physical Exam    General appearance -elderly, frail, cachectic, well appearing, appears uncomfortable   eyes - pupils equal and reactive, extraocular eye movements intact  ENT - mucous membranes moist, pharynx normal without lesions  Neck - supple, no significant adenopathy; non-tender to palpation  Chest - clear to auscultation, no wheezes, rales or rhonchi; non-tender to palpation  Heart - normal rate and regular rhythm, S1 and S2 normal, no murmurs noted  Abdomen - soft, generalized tenderness, no rebound or guarding, nondistended, no masses or organomegaly  Musculoskeletal - no joint tenderness, deformity or swelling; normal ROM  Extremities - peripheral pulses normal, no pedal edema  Skin - normal coloration and turgor, no rashes  Neurological - alert, oriented x3, normal speech, no focal findings or movement disorder noted    Diagnostic Study Results     Labs -     Recent Results (from the past 12 hour(s))   CBC WITH AUTOMATED DIFF    Collection Time: 01/27/20 11:02 PM   Result Value Ref Range    WBC 10.9 3.6 - 11.0 K/uL    RBC 3.93 3.80 - 5.20 M/uL    HGB 12.0 11.5 - 16.0 g/dL    HCT 38.4 35.0 - 47.0 %    MCV 97.7 80.0 - 99.0 FL    MCH 30.5 26.0 - 34.0 PG    MCHC 31.3 30.0 - 36.5 g/dL    RDW 13.0 11.5 - 14.5 %    PLATELET 083 175 - 524 K/uL    MPV 10.4 8.9 - 12.9 FL    NRBC 0.0 0  WBC    ABSOLUTE NRBC 0.00 0.00 - 0.01 K/uL    NEUTROPHILS PENDING %    LYMPHOCYTES PENDING %    MONOCYTES PENDING %    EOSINOPHILS PENDING %    BASOPHILS PENDING %    IMMATURE GRANULOCYTES PENDING %    ABS. NEUTROPHILS PENDING K/UL    ABS. LYMPHOCYTES PENDING K/UL    ABS. MONOCYTES PENDING K/UL    ABS. EOSINOPHILS PENDING K/UL    ABS. BASOPHILS PENDING K/UL    ABS. IMM. GRANS. PENDING K/UL    DF PENDING        Radiologic Studies -   CT ABD PELV W CONT    (Results Pending)     CT Results  (Last 48 hours)    None        CXR Results  (Last 48 hours)    None            Medical Decision Making   I am the first provider for this patient. I reviewed the vital signs, available nursing notes, past medical history, past surgical history, family history and social history. Vital Signs-Reviewed the patient's vital signs. Patient Vitals for the past 12 hrs:   Temp Pulse Resp BP SpO2   01/27/20 2236 98 °F (36.7 °C) (!) 112 24 90/61 100 %       EKG: Sinus tachycardia, 101 bpm, normal axis, normal OH, QRS, QTc intervals, no ischemic changes    Records Reviewed: Nursing Notes and Old Medical Records    Provider Notes (Medical Decision Making):   Differential diagnosis: Gastritis, pancreatitis, bowel obstruction, gastroenteritis    ED Course:   Initial assessment performed. The patients presenting problems have been discussed, and they are in agreement with the care plan formulated and outlined with them. I have encouraged them to ask questions as they arise throughout their visit. Progress Notes:   Patient has been having multiple episodes of diarrhea in the ED. She is weak and a fall risk and lives alone. She also has a UTI.   Will discuss with hospitalist for possible admission    Disposition:  Admit to hospitalist    Case discussed with hospitalist who recommends NG tube given gastric distention seen on CT      Diagnosis     Clinical Impression:   1. Vomiting and diarrhea    2.  Urinary tract infection without hematuria, site unspecified

## 2020-01-28 NOTE — PROGRESS NOTES
Pt seen & examined briefly during my AM rounds. Pt has declined re- attempt of NGT placement after failed attempt in ER-- recommended pt to allow it for her own comfort. S/p 1 L NS in ER, will give 1 more L now as lactate has trended up to 3.2 now from 2.1 in ER. Asked RN to call Gen Surgery consult as ordered- confirm they will see pt soon.   Cont IVF with NPO for now, IV zofran prn  Serial Abd imaging as planned  Will await Ip surgery recommendations  Empiric IV Abx for now            DO NOT BILL

## 2020-01-28 NOTE — H&P
Hospitalist Admission Note    NAME: Denae Burdick   :  1935   MRN:  [id number]     Date/Time:  2020 5:50 AM    Patient PCP: Unknown, Provider  ______________________________________________________________________  Given the patient's current clinical presentation, I have a high level of concern for decompensation if discharged from the emergency department. Complex decision making was performed, which includes reviewing the patient's available past medical records, laboratory results, and x-ray films. My assessment of this patient's clinical condition and my plan of care is as follows. Assessment / Plan:  abdominal pain associated with diarrhea and vomiting rule out gastroenteritis   Admit patient to telemetry  Follow-up stool study  Start patient with empiric antibiotic therapy Flagyl and Levaquin   CT abdomen was done and showed significantly dilated stomach   Surgery consultation   NG tube insertion    UTI   Start patient on IV antibiotic Levaquin   Follow-up urine culture    History of sarcoidosis  Patient currently not on any medication    Acute renal failure  Start patient on IV fluid   Code Status: Full  Surrogate Decision Maker:Namrata Angeles    DVT Prophylaxis: Heparin   GI Prophylaxis: not indicated          Subjective:   CHIEF COMPLAINT: nausea and vomiting     HISTORY OF PRESENT ILLNESS:     82years old female from home with past medical history significant for sarcoidosis, abdominal surgery presented to the hospital for evaluation of abdominal pain epigastric pain associated with nausea and vomiting started about 7 PM yesterday, patient denies any fever any chills denies any blood in the stool, CT abdomen was done and showed significantly dilated fluid-filled stomach with elevation of the left hemidiaphragm and mildly dilated fluid-filled colon suggest diarrhea no definite obstruction.     We were asked to admit for work up and evaluation of the above problems. Past Medical History:   Diagnosis Date    Arthritis     Perforated bowel ([facility] [address]     Sarcoidosis         Past Surgical History:   Procedure Laterality Date    HX HERNIA REPAIR         Social History     Tobacco Use    Smoking status: Former Smoker    Smokeless tobacco: Never Used   Substance Use Topics    Alcohol use: No         family history on file. HTN   Allergies   Allergen Reactions    Penicillin G Other (comments)        Prior to Admission medications    Medication Sig Start Date End Date Taking? Authorizing Provider   HYDROcodone-acetaminophen (NORCO) 5-325 mg per tablet Take 1 Tab by mouth every four (4) hours as needed for Pain. Max Daily Amount: 6 Tabs. 5/13/18   LONA Lion       REVIEW OF SYSTEMS:     I am not able to complete the review of systems because:    The patient is intubated and sedated    The patient has altered mental status due to his acute medical problems    The patient has baseline aphasia from prior stroke(s)    The patient has baseline dementia and is not reliable historian    The patient is in acute medical distress and unable to provide information           Total of 12 systems reviewed as follows:       POSITIVE= underlined text  Negative = text not underlined  General:  fever, chills, sweats, generalized weakness, weight loss/gain,      loss of appetite   Eyes:    blurred vision, eye pain, loss of vision, double vision  ENT:    rhinorrhea, pharyngitis   Respiratory:   cough, sputum production, SOB, HAYDEN, wheezing, pleuritic pain   Cardiology:   chest pain, palpitations, orthopnea, PND, edema, syncope   Gastrointestinal:  abdominal pain , N/V, diarrhea, dysphagia, constipation, bleeding   Genitourinary:  frequency, urgency, dysuria, hematuria, incontinence   Muskuloskeletal :  arthralgia, myalgia, back pain  Hematology:  easy bruising, nose or gum bleeding, lymphadenopathy   Dermatological: rash, ulceration, pruritis, color change / jaundice  Endocrine:   hot flashes or polydipsia   Neurological:  headache, dizziness, confusion, focal weakness, paresthesia,     Speech difficulties, memory loss, gait difficulty  Psychological: Feelings of anxiety, depression, agitation    Objective:   VITALS:    Visit Vitals  /71   Pulse 97   Temp 98.5 °F (36.9 °C)   Resp 26   Ht 5' (1.524 m)   Wt 39.5 kg (87 lb)   SpO2 98%   BMI 16.99 kg/m²       PHYSICAL EXAM:    General:    Alert, cooperative, no distress, appears stated age. HEENT: Atraumatic, anicteric sclerae, pink conjunctivae     No oral ulcers, mucosa moist, throat clear, dentition fair  Neck:  Supple, symmetrical,  thyroid: non tender  Lungs:   Clear to auscultation bilaterally. No Wheezing or Rhonchi. No rales. Chest wall:  No tenderness  No Accessory muscle use. Heart:   Regular  rhythm,  No  murmur   No edema  Abdomen:   Soft, tenderness epigastric .  distended. Bowel sounds normal  Extremities: No cyanosis. No clubbing,      Skin turgor normal, Capillary refill normal, Radial dial pulse 2+  Skin:     Not pale. Not Jaundiced  No rashes   Psych:  Good insight. Not depressed. Not anxious or agitated. Neurologic: EOMs intact. No facial asymmetry. No aphasia or slurred speech. Symmetrical strength, Sensation grossly intact.  Alert and oriented X 4.     _______________________________________________________________________  Care Plan discussed with:    Comments   Patient y    Family      RN y    Care Manager                    Consultant:      _______________________________________________________________________  Expected  Disposition:   Home with Family y   HH/PT/OT/RN    SNF/LTC    KENDY    ________________________________________________________________________  TOTAL TIME:  61  Minutes    Critical Care Provided     Minutes non procedure based      Comments    y Reviewed previous records   >50% of visit spent in counseling and coordination of care y Discussion with patient and/or family and questions answered       ________________________________________________________________________  Signed: John Lozoya MD    Procedures: see electronic medical records for all procedures/Xrays and details which were not copied into this note but were reviewed prior to creation of Plan. LAB DATA REVIEWED:    Recent Results (from the past 24 hour(s))   CBC WITH AUTOMATED DIFF    Collection Time: 01/27/20 11:02 PM   Result Value Ref Range    WBC 10.9 3.6 - 11.0 K/uL    RBC 3.93 3.80 - 5.20 M/uL    HGB 12.0 11.5 - 16.0 g/dL    HCT 38.4 35.0 - 47.0 %    MCV 97.7 80.0 - 99.0 FL    MCH 30.5 26.0 - 34.0 PG    MCHC 31.3 30.0 - 36.5 g/dL    RDW 13.0 11.5 - 14.5 %    PLATELET 848 602 - 239 K/uL    MPV 10.4 8.9 - 12.9 FL    NRBC 0.0 0  WBC    ABSOLUTE NRBC 0.00 0.00 - 0.01 K/uL    NEUTROPHILS 91 (H) 32 - 75 %    LYMPHOCYTES 6 (L) 12 - 49 %    MONOCYTES 2 (L) 5 - 13 %    EOSINOPHILS 0 0 - 7 %    BASOPHILS 0 0 - 1 %    IMMATURE GRANULOCYTES 1 (H) 0.0 - 0.5 %    ABS. NEUTROPHILS 9.9 (H) 1.8 - 8.0 K/UL    ABS. LYMPHOCYTES 0.7 (L) 0.8 - 3.5 K/UL    ABS. MONOCYTES 0.2 0.0 - 1.0 K/UL    ABS. EOSINOPHILS 0.0 0.0 - 0.4 K/UL    ABS. BASOPHILS 0.0 0.0 - 0.1 K/UL    ABS. IMM. GRANS. 0.1 (H) 0.00 - 0.04 K/UL    DF AUTOMATED      RBC COMMENTS NORMOCYTIC, NORMOCHROMIC     METABOLIC PANEL, COMPREHENSIVE    Collection Time: 01/27/20 11:02 PM   Result Value Ref Range    Sodium 142 136 - 145 mmol/L    Potassium 3.9 3.5 - 5.1 mmol/L    Chloride 109 (H) 97 - 108 mmol/L    CO2 27 21 - 32 mmol/L    Anion gap 6 5 - 15 mmol/L    Glucose 102 (H) 65 - 100 mg/dL    BUN 24 (H) 6 - 20 MG/DL    Creatinine 1.41 (H) 0.55 - 1.02 MG/DL    BUN/Creatinine ratio 17 12 - 20      GFR est AA 43 (L) >60 ml/min/1.73m2    GFR est non-AA 36 (L) >60 ml/min/1.73m2    Calcium 9.3 8.5 - 10.1 MG/DL    Bilirubin, total 0.6 0.2 - 1.0 MG/DL    ALT (SGPT) 25 12 - 78 U/L    AST (SGOT) 31 15 - 37 U/L    Alk.  phosphatase 74 45 - 117 U/L    Protein, total 8.1 6.4 - 8.2 g/dL Albumin 4.1 3.5 - 5.0 g/dL    Globulin 4.0 2.0 - 4.0 g/dL    A-G Ratio 1.0 (L) 1.1 - 2.2     LIPASE    Collection Time: 01/27/20 11:02 PM   Result Value Ref Range    Lipase 337 73 - 393 U/L   CK    Collection Time: 01/27/20 11:02 PM   Result Value Ref Range     26 - 192 U/L   TROPONIN I    Collection Time: 01/27/20 11:02 PM   Result Value Ref Range    Troponin-I, Qt. <0.05 <0.05 ng/mL   LACTIC ACID    Collection Time: 01/28/20 12:51 AM   Result Value Ref Range    Lactic acid 2.1 (HH) 0.4 - 2.0 MMOL/L   EKG, 12 LEAD, INITIAL    Collection Time: 01/28/20  1:08 AM   Result Value Ref Range    Ventricular Rate 101 BPM    Atrial Rate 101 BPM    P-R Interval 180 ms    QRS Duration 62 ms    Q-T Interval 336 ms    QTC Calculation (Bezet) 435 ms    Calculated P Axis 60 degrees    Calculated R Axis 12 degrees    Calculated T Axis 1 degrees    Diagnosis       Sinus tachycardia  Low voltage QRS  No previous ECGs available     URINALYSIS W/ REFLEX CULTURE    Collection Time: 01/28/20  1:20 AM   Result Value Ref Range    Color YELLOW/STRAW      Appearance CLOUDY (A) CLEAR      Specific gravity 1.021 1.003 - 1.030      pH (UA) 6.0 5.0 - 8.0      Protein NEGATIVE  NEG mg/dL    Glucose NEGATIVE  NEG mg/dL    Ketone NEGATIVE  NEG mg/dL    Bilirubin NEGATIVE  NEG      Blood TRACE (A) NEG      Urobilinogen 0.2 0.2 - 1.0 EU/dL    Nitrites NEGATIVE  NEG      Leukocyte Esterase SMALL (A) NEG      WBC 5-10 0 - 4 /hpf    RBC 0-5 0 - 5 /hpf    Epithelial cells FEW FEW /lpf    Bacteria 3+ (A) NEG /hpf    UA:UC IF INDICATED URINE CULTURE ORDERED (A) CNI

## 2020-01-28 NOTE — PROGRESS NOTES
Pt.is very upset,she said I want to go home because they do not feed me and I have nothing to eat . Explained to patient that the surgeon needs to see her and she cannot have anything by mouth. Pt.also refused NGT .

## 2020-01-28 NOTE — PROGRESS NOTES
Initial Nutrition Assessment:    INTERVENTIONS/RECOMMENDATIONS:   · Diet advancement as medically feasible     ASSESSMENT:   Patient medically noted for diarrhea/nausea/vomiting, UTI, NORMAN, and enteritis. PMH sarcoidosis. Patient currently NPO; general surgery following. Initially refused NGT but able to have one placed this afternoon. Current weight is \"estimated\" so unsure of accuracy; previous weight is from 2018 and is consistent with current weigh. Will monitor ability to advance diet vs need for nutrition support. Diet Order: NPO  % Eaten:  No data found. Pertinent Medications: [x]Reviewed []Other: D5% IVF, PRN Dulcolax and zofran  Pertinent Labs: [x]Reviewed []Other:   Food Allergies: [x]None []Yes:    Last BM: 1/28  [x]Active     []Hyperactive  []Hypoactive       [] Absent BS  Skin:    [x] Intact   [] Incision  [] Breakdown: [] Edema []Other:    Anthropometrics:   Height: 5' (152.4 cm) Weight: 39.5 kg (87 lb)   IBW (%IBW):   ( ) UBW (%UBW):   (  %)   Last Weight Metrics:  Weight Loss Metrics 1/27/2020 5/13/2018   Today's Wt 87 lb 88 lb 10 oz   BMI 16.99 kg/m2 17.31 kg/m2       BMI: Body mass index is 16.99 kg/m². This BMI is indicative of:   [x]Underweight    []Normal    []Overweight    [] Obesity   [] Extreme Obesity (BMI>40)     Estimated Nutrition Needs (Based on):   1316 Kcals/day(BMR (782) x 1.3AF +300kcal) , 52 g(1.3 g/kg bw) Protein  Carbohydrate:  At Least 130 g/day  Fluids: 1300 mL/day (1ml/kcal)    Pt expected to meet estimated nutrient needs: []Yes [x]No    NUTRITION DIAGNOSES:   Problem:  Altered GI function      Etiology: related to enteritis, fluid fillled stomach and colon     Signs/Symptoms: as evidenced by imaging, nausea/vomiting/diarrhea, NPO, NGT suction       NUTRITION INTERVENTIONS:  Meals/Snacks: General/healthful diet                  GOAL:   Diet advanced next 2-3 days    LEARNING NEEDS (Diet, Food/Nutrient-Drug Interaction):    [x] None Identified   [] Identified and Education Provided/Documented   [] Identified and Pt declined/was not appropriate     Cultural, Christian, OR Ethnic Dietary Needs:    [x] None Identified   [] Identified and Addressed     [x] Interdisciplinary Care Plan Reviewed/Documented    [x] Discharge Planning: TBD       MONITORING /EVALUATION:   Food/Nutrient Intake Outcomes:  Total energy intake, IV fluids  Physical Signs/Symptoms Outcomes: Weight/weight change, GI profile, Electrolyte and renal profile, Glucose profile    NUTRITION RISK:    [x] Patient At Nutritional Risk              [] Patient Not at Nutritional Risk    PT SEEN FOR:    []  MD Consult: []Calorie Count      []Diabetic Diet Education        []Diet Education     []Electrolyte Management     []General Nutrition Management and Supplements     []Management of Tube Feeding     []TPN Recommendations    [x]  RN Referral:  [x]MST score >=2     []Enteral/Parenteral Nutrition PTA     []Pregnant: Gestational DM or Multigestation     []Pressure Ulcer/Wound Care needs        [x]  Low BMI  []  GEOFFREY Salomon [phone]  Pager [phone]    Weekend Pager [phone]

## 2020-01-29 ENCOUNTER — APPOINTMENT (OUTPATIENT)
Dept: GENERAL RADIOLOGY | Age: 85
DRG: 392 | End: 2020-01-29
Attending: SURGERY
Payer: MEDICARE

## 2020-01-29 LAB
ANION GAP SERPL CALC-SCNC: 4 MMOL/L (ref 5–15)
BUN SERPL-MCNC: 17 MG/DL (ref 6–20)
BUN/CREAT SERPL: 19 (ref 12–20)
CALCIUM SERPL-MCNC: 6.9 MG/DL (ref 8.5–10.1)
CHLORIDE SERPL-SCNC: 120 MMOL/L (ref 97–108)
CO2 SERPL-SCNC: 24 MMOL/L (ref 21–32)
CREAT SERPL-MCNC: 0.91 MG/DL (ref 0.55–1.02)
ERYTHROCYTE [DISTWIDTH] IN BLOOD BY AUTOMATED COUNT: 13.4 % (ref 11.5–14.5)
GLUCOSE SERPL-MCNC: 135 MG/DL (ref 65–100)
HCT VFR BLD AUTO: 28.3 % (ref 35–47)
HGB BLD-MCNC: 8.9 G/DL (ref 11.5–16)
LACTATE SERPL-SCNC: 1.2 MMOL/L (ref 0.4–2)
MCH RBC QN AUTO: 30.8 PG (ref 26–34)
MCHC RBC AUTO-ENTMCNC: 31.4 G/DL (ref 30–36.5)
MCV RBC AUTO: 97.9 FL (ref 80–99)
NRBC # BLD: 0 K/UL (ref 0–0.01)
NRBC BLD-RTO: 0 PER 100 WBC
PLATELET # BLD AUTO: 153 K/UL (ref 150–400)
PMV BLD AUTO: 10.6 FL (ref 8.9–12.9)
POTASSIUM SERPL-SCNC: 4 MMOL/L (ref 3.5–5.1)
RBC # BLD AUTO: 2.89 M/UL (ref 3.8–5.2)
SODIUM SERPL-SCNC: 148 MMOL/L (ref 136–145)
WBC # BLD AUTO: 11.2 K/UL (ref 3.6–11)

## 2020-01-29 PROCEDURE — 74011000258 HC RX REV CODE- 258: Performed by: INTERNAL MEDICINE

## 2020-01-29 PROCEDURE — 65660000000 HC RM CCU STEPDOWN

## 2020-01-29 PROCEDURE — 74011250636 HC RX REV CODE- 250/636: Performed by: INTERNAL MEDICINE

## 2020-01-29 PROCEDURE — 83605 ASSAY OF LACTIC ACID: CPT

## 2020-01-29 PROCEDURE — 74011250637 HC RX REV CODE- 250/637: Performed by: INTERNAL MEDICINE

## 2020-01-29 PROCEDURE — 97535 SELF CARE MNGMENT TRAINING: CPT | Performed by: OCCUPATIONAL THERAPIST

## 2020-01-29 PROCEDURE — 80048 BASIC METABOLIC PNL TOTAL CA: CPT

## 2020-01-29 PROCEDURE — 97165 OT EVAL LOW COMPLEX 30 MIN: CPT | Performed by: OCCUPATIONAL THERAPIST

## 2020-01-29 PROCEDURE — 77010033678 HC OXYGEN DAILY

## 2020-01-29 PROCEDURE — 85027 COMPLETE CBC AUTOMATED: CPT

## 2020-01-29 PROCEDURE — 97116 GAIT TRAINING THERAPY: CPT

## 2020-01-29 PROCEDURE — 97161 PT EVAL LOW COMPLEX 20 MIN: CPT

## 2020-01-29 PROCEDURE — 94760 N-INVAS EAR/PLS OXIMETRY 1: CPT

## 2020-01-29 PROCEDURE — 36415 COLL VENOUS BLD VENIPUNCTURE: CPT

## 2020-01-29 PROCEDURE — 74019 RADEX ABDOMEN 2 VIEWS: CPT

## 2020-01-29 PROCEDURE — 74011250636 HC RX REV CODE- 250/636: Performed by: HOSPITALIST

## 2020-01-29 RX ORDER — LEVOFLOXACIN 5 MG/ML
250 INJECTION, SOLUTION INTRAVENOUS EVERY 24 HOURS
Status: DISCONTINUED | OUTPATIENT
Start: 2020-01-29 | End: 2020-01-31

## 2020-01-29 RX ORDER — DEXTROSE MONOHYDRATE AND SODIUM CHLORIDE 5; .45 G/100ML; G/100ML
75 INJECTION, SOLUTION INTRAVENOUS CONTINUOUS
Status: DISPENSED | OUTPATIENT
Start: 2020-01-29 | End: 2020-01-31

## 2020-01-29 RX ADMIN — ACETAMINOPHEN 650 MG: 650 SUPPOSITORY RECTAL at 10:44

## 2020-01-29 RX ADMIN — METRONIDAZOLE 500 MG: 500 INJECTION, SOLUTION INTRAVENOUS at 09:20

## 2020-01-29 RX ADMIN — LEVOFLOXACIN 250 MG: 5 INJECTION, SOLUTION INTRAVENOUS at 12:15

## 2020-01-29 RX ADMIN — METRONIDAZOLE 500 MG: 500 INJECTION, SOLUTION INTRAVENOUS at 21:02

## 2020-01-29 RX ADMIN — Medication 10 ML: at 05:37

## 2020-01-29 RX ADMIN — HEPARIN SODIUM 5000 UNITS: 5000 INJECTION INTRAVENOUS; SUBCUTANEOUS at 05:37

## 2020-01-29 RX ADMIN — MORPHINE SULFATE 2 MG: 2 INJECTION, SOLUTION INTRAMUSCULAR; INTRAVENOUS at 21:02

## 2020-01-29 RX ADMIN — ONDANSETRON 4 MG: 2 INJECTION INTRAMUSCULAR; INTRAVENOUS at 06:02

## 2020-01-29 RX ADMIN — HEPARIN SODIUM 5000 UNITS: 5000 INJECTION INTRAVENOUS; SUBCUTANEOUS at 17:33

## 2020-01-29 RX ADMIN — Medication 10 ML: at 17:37

## 2020-01-29 RX ADMIN — DEXTROSE MONOHYDRATE AND SODIUM CHLORIDE 75 ML/HR: 5; .45 INJECTION, SOLUTION INTRAVENOUS at 09:20

## 2020-01-29 NOTE — PROGRESS NOTES
Orders received, chart reviewed and patient evaluated by physical therapy. Pending progression with skilled acute physical therapy, recommend:  Physical therapy at least 2 days/week in the home for home safety evaluation. Pt functioning at her baseline independent level and has no further skilled therapy needs in acute care setting. Pt should ambulate with supervision of RN while in hospital in order to maintain strength, balance, and functional independence. Of note, pt received supine in bed with supplemental O2 OFF on wall however nasal canula present in pt nose. O2 sats 85% at supine and seated rest and remained 86-87% on RA despite pursed lip breathing. Reapplied 2L O2 for remainder of therapy session. Full evaluation to follow.

## 2020-01-29 NOTE — PHYSICIAN ADVISORY
Letter of Status Determination: Current Status INPATIENT is Appropriate Pt Name:  Harris Bey MR#  [id number] [facility]#   [id number] Room and Hospital  3113/01  @ [facility] Hospitalization date  1/27/2020  9:56 PM  
Current Attending Physician  Adamaris Smith MD  
Principal diagnosis  <principal problem not specified> Clinicals  82years old female from home with past medical history significant for sarcoidosis, abdominal surgery presented to the hospital for evaluation of abdominal pain epigastric pain associated with nausea and vomiting started about 7 PM yesterday, patient denies any fever any chills denies any blood in the stool, CT abdomen was done and showed significantly dilated fluid-filled stomach with elevation of the left hemidiaphragm and mildly dilated fluid-filled colon suggest diarrhea no definite obstruction. [place] MCG criteria Does  NOT apply STATUS DETERMINATION  On the basis of clinical data, available documentation, we believe that the current status of this patient as INPATIENT is Appropriate This patient is at above high risk of deterioration based on documented presenting clinical data, comorbid conditions, high risk of adverse events and current acute care course. Ms. Harris Bey now meets Inpatient Admission status criteria in accordance with CMS regulation Section 43 .3. Specifically, due to medical necessity the patient's stay now exceeds Two Midnights. It is our recommendation that this patient's hospitalization status should be upgraded from  INPATIENT to INPATIENT status. The final decision of the patient's hospitalization status depends on the attending physician's judgment Additional comments Insurance  Payor: [place] MEDICARE / Plan: VA MEDICARE PART A & B / Product Type: Medicare / Insurance Information Lay Phone: Subscriber: Esthelamelodie Malachi Subscriber#: [id number] Group#:  Precert#:   
   
 [employer] [address] MEDICARE Phone:   
 SubscribPhamjorydaisy Tomlinson Subscriber#: [id number] Group#:  Precert#:   
  
 
  
 
 
 
[email or web address] AIDAN BARDALES Physician [address]

## 2020-01-29 NOTE — ROUTINE PROCESS
Report given to Ysabel 
  
Millie [facility]: [id number] 
  
  
Significant changes during shift:   
Seen by PT/ OT, CM, general surgery. NGT taken out by gen surg MD  
  
  
  
Patient Information 
  
Camden Stinson 82 y.o. 
1/27/2020  9:56 PM by Jamel Yarbrough [place], [address] admitted from Home 
  
Problem List 
  
       
Patient Active Problem List  
  Diagnosis Date Noted  Diarrhea 01/28/2020  
  
       
Past Medical History:  
Diagnosis Date  Arthritis    
 Perforated bowel (HCC)    
 Sarcoidosis    
  
  
  
Core Measures: 
  
CVA: No No 
CHF:No No 
PNA:No No 
  
Post Op Surgical (If Applicable):  
  
Number times ambulated in hallway past shift:  NO Number of times OOB to chair past shift:   NO 
NG Tube: No 
Incentive Spirometer: No 
Drains: No   Volume   
Dressing Present:  No 
Flatus:  Not applicable 
  
Activity Status: 
  
OOB to Chair No 
Ambulated this shift No  
Bed Rest Yes 
  
Supplemental N8: (SK Applicable) 
  
NC No 
NRB No 
Venti-mask No 
On  Liters/min 
  
  
LINES AND DRAINS: 
  
Central Line? No  
  
PICC LINE? No  
  
Urinary Catheter? No  
  
DVT prophylaxis: 
  
DVT prophylaxis Med- Yes DVT prophylaxis SCD or MEL- Refused  
  
Wounds: (If Applicable) 
  
Wounds- No 
  
Location  
  
Patient Safety: 
  
Falls Score Total Score: 4 Safety Level_______ Bed Alarm On? Yes Sitter? No 
  
Plan for upcoming shift: safety, wean O2 
 
  
Discharge Plan: probable home when stable 
  
Active Consults: 
IP CONSULT TO GENERAL SURGERY

## 2020-01-29 NOTE — PROGRESS NOTES
Surgical Note    Date/Time:  2020 9:54 AM        Assessment :    Plan:  Patient Active Problem List   Diagnosis Code    Diarrhea R19.7      AXR still with generalized distention  NGT in place, difficult placement due to prior hiatal hernia repair. Not putting much out. DC    No evidence of getting out out bed and/or ambulating. Pt unlikely to improve just lying in bed. Keep NPO in bed. Can have sips of clears when in chair and advance if tolerates. Hgb trending down due to resuscitation. No clinical evidence of bleeding. No surgical intervention planned. Subjective:     Chief Complaint:      Review of Systems:  Y  N       Y  N  [] [x]  Fever/chills                                               [] [x]  Chest Pain  [] [x]  Cough                                                       [] [x]  Diarrhea   [] [x]  Sputum                                                     [] [x]  Constipation  [] [x]  SOB/HAYDEN                                                [] [x]  Nausea/Vomit  [] [x]  Abd Pain                                                    [x] []  Tolerating diet  [] [x]  Dysuria                                                           []Unable to obtain  ROS due to  []mental status change  []sedated   []intubated    Objective:     VITALS:   Last 24hrs VS reviewed since prior hospitalist progress note.  Most recent are:  Visit Vitals  /79   Pulse 82   Temp 97.7 °F (36.5 °C)   Resp 20   Ht 5' (1.524 m)   Wt 39.5 kg (87 lb)   SpO2 95%   Breastfeeding No   BMI 16.99 kg/m²     Temp (24hrs), Av.1 °F (36.7 °C), Min:97.7 °F (36.5 °C), Max:98.7 °F (37.1 °C)    No intake or output data in the 24 hours ending 20 0954      []Mcclellan []NGT  []Intubated on vent    PHYSICAL EXAM:  Gen:  [] A&O  []non-toxic  [x] No acute distress             [] ill apearing  []  Critical        HEENT:   [x]NC/AT/PERRLA/EOMI    []pink conjunctivae      []pale conjunctivae                  PERRL  []yes  []no []moist mucosa    []dry mucosa    hearing intact to voice []yes  []No    RESP:   [x]CTA bialterally/no wheezing/rhonchi/rales/crackles    []clear bilaterally  []wheezing   []rhonchi   []crackles     use of accessory muscles []yes []no    CARD:   [x] regular rate and rhythm/No murmurs/rubs/gallops    murmur  []yes ()  []no      Rubs  []yes  []no       Gallops []yes  []no    Rate [] regular  [] irregular        carotid bruits  []Right  [] Left                 LE edema []yes  []no           JVP  [] yes   [] no    ABD:    [x]soft  [x]non distended  [x]non tender  [] NABS    SKIN:   Rashes [] yes   [x] no    Ulcers [] yes   [x] no               [x]normal   []tight to palpitation    skin turgor [] good  []poor  []decreased               Cyanosis/clubbing [] yes [x] no    NEUR:   [x]cranial nerves II-XII grossly intact       []Cranial nerves deficit                 [] facial droop    [] slurred speech   []aphasic     []Strength normal     [] weakness  [] LUE  []  RUE/ [] LLE  []  RLE    follows commands  [] yes [] no           PSYCH:   insight []poor [x]good   Alert and Oriented to  [x]person  [x]place  [x] time                    []depressed []anxious []agitated  []lethargic []stuporous  []sedated           Lab Data Reviewed: (see below)    Medications Reviewed: (see below)    PMH/SH reviewed - no change compared to H&P    Care Plan discussed with:  [x]Patient   []Family    [x]Care Manager   []RN    []Consultant/Specialist :    Prophylaxis:  []Lovenox  []Coumadin  []Hep SQ  []SCDs  []H2B/PPI    Disposition:  []Home w/ Family   []HH PT,OT,RN   []SNF/LTC   []SAH/Rehab    Ancillary Serices:   [] PT     []OT      []SW      []Nut      []HH ________________________________________________________________________  LABS:  Recent Labs     01/29/20 0315 01/27/20 2302   WBC 11.2* 10.9   HGB 8.9* 12.0   HCT 28.3* 38.4    224     Recent Labs     01/29/20 0315 01/27/20 2302   * 142   K 4.0 3.9   * 109*   CO2 24 27   BUN 17 24*   CREA 0.91 1.41*   * 102*   CA 6.9* 9.3     Recent Labs     01/27/20  2302   SGOT 31   ALT 25   AP 74   TBILI 0.6   TP 8.1   ALB 4.1   GLOB 4.0   LPSE 337     No results for input(s): INR, PTP, APTT, INREXT in the last 72 hours. No results for input(s): FE, TIBC, PSAT, FERR in the last 72 hours. No results for input(s): PH, PCO2, PO2 in the last 72 hours.   Recent Labs     01/27/20  2302        No results found for: GLUCPOC    MEDICATIONS:  Current Facility-Administered Medications   Medication Dose Route Frequency    dextrose 5 % - 0.45% NaCl infusion  75 mL/hr IntraVENous CONTINUOUS    sodium chloride (NS) flush 5-40 mL  5-40 mL IntraVENous Q8H    sodium chloride (NS) flush 5-40 mL  5-40 mL IntraVENous PRN    acetaminophen (TYLENOL) suppository 650 mg  650 mg Rectal Q6H PRN    ondansetron (ZOFRAN) injection 4 mg  4 mg IntraVENous Q6H PRN    bisacodyL (DULCOLAX) suppository 10 mg  10 mg Rectal DAILY PRN    heparin (porcine) injection 5,000 Units  5,000 Units SubCUTAneous Q12H    lidocaine (XYLOCAINE) 2 % viscous solution 15 mL  15 mL Mouth/Throat PRN    [START ON 1/30/2020] levoFLOXacin (LEVAQUIN) 250 mg in D5W IVPB  250 mg IntraVENous Q48H    metroNIDAZOLE (FLAGYL) IVPB premix 500 mg  500 mg IntraVENous Q12H    morphine injection 2 mg  2 mg IntraVENous Q4H PRN

## 2020-01-29 NOTE — PROGRESS NOTES
Occupational Therapy  Orders received and medical record reviewed. Pt participated in OT Evaluation and does not need OT services at this time, as pt is close to her baseline level. Pt was on 2L nasal cannula during evaluation, as O2 sats had dropped into the mid 80s on room air. Pt reports no O2 use at home, however, appears dyspneic during adls/mobility. Pt would greatly benefit from OOB activities to maintain strength and endurance while hospitalized for her medical condition. Regularly  sitting up in the chair, participation in adls as appropriate, and supervision with nursing staff to ambulate to the bathroom, is recommended. Will complete the OT orders. Full OT note to follow.

## 2020-01-29 NOTE — PROGRESS NOTES
Transition of Care Plan:         -Home once medically stable. Pt lives alone but has family that we can check with to provide additional assistance if needed. MD was thinking possibly home 1/31/20 if stable.   -Home Health if PCP in [place] [address] willing to sign HH orders.  -[facility] to bridge gap until Pt goes to PCP appt?  -Pt does not have local PCP. Pt goes to PCP Steffany Duarte at the [facility] in [place] [address]. Son flies her down 4-5 times per year. ++CM called office to notifiy them she was in hospital.  Got PCP follow for 2/19/20 at 2 PM since she missed follow up appt for today. They will ask MD if she will be willing to sign HH orders and call us back tomorrow, 1/30/2020.   -RN to continue to wean oxygen and CM may need to order Home Oxygen prior to [address] can transport her home in car if able. -Second IM letter will be needed prior to DC      Reason for Admission:   Pt was admitted on 1/28/2020 d/t a Dx of ABD pain associated W Diarrhea and Vomiting. Colitis. UTI? PCP: Dr. Steffany Duarte: [facility]:     [address] [place] [id number]    Appointment:  [phone]  Fax:  [phone]    RUR Score:          10           Plan for utilizing home health:  Pt seemed open to using [facility]. . not sure how that would work since PCP is in [place] [address]. Will need to investigate options if needed prior to DC. Current Advanced Directive/Advance Care Plan:  Full Code. Pt does not have AMD on file. CM offered to have pastoral care come and do AMD and Pt declined at this point. 2:47 PM   Pt lives alone in one story mobile home with 4 MARYANNE. DTR, Luis M Shepherd lives in the same trailer site and does not work so she is available. Other DTR, Manuelito lives nearby in [facility] as well. Son, Yoli Dixon lives in [facility]. [address]: [phone]. Pt has RW at home. PT DOES NOT HAVE HOME OXYGEN. PLEASE WEAN HOME OXYGEN.   Pt has no SNF or HH experience. [facility]. DTR can help transport Pt home in car. CM called Fabián Araujo: [phone] and CM gave her update. Let her know that therapy was recommending [facility] services but I needed to call the [facility] MD to see if they would sign HH orders. Enid Dsouza indicated that Pt had oxygen in the past but she was not sure if Pt had oxygen currently at home an just not using it. She will go to house today and see if there is any home oxygen at home and look to see if sticker is on equipment and let us know. Enid Meet requested that Hospitalist call her brother, Dr. David Palomares: [phone] to give him an update. CM asked RN to perfect serve the MD to see if he could call MD per family request.  Pt is not currently open to [facility] services and PT/Family did not have a preference for [facility] services. CM will need to call PCP to see if they would be willing to sign HH orders. CM called [facility]: [phone] to see if they would be willing sign HH orders for this Pt. Office will call back tomorrow and let us know. Office indicated that she had an appt today that was cancelled d/t hospitalization. Office went ahead an scheduled another PCP appt for 2/19/2020 at 2 PM.  CM placed this information on AVS.  Family will need to make arrangements to see if she can get to [facility] that day. RN sent Perfect Serve message to MD to see if he can call son, Dr. David Palomares [phone].     Santino Rosa, [address]  Ext [phone]

## 2020-01-29 NOTE — PROGRESS NOTES
Problem: Risk for Spread of Infection  Goal: Prevent transmission of infectious organism to others  Description  Prevent the transmission of infectious organisms to other patients, staff members, and visitors. Outcome: Progressing Towards Goal     Problem: Patient Education:  Go to Education Activity  Goal: Patient/Family Education  Outcome: Progressing Towards Goal     Problem: Falls - Risk of  Goal: *Absence of Falls  Description  Document Gilberto Jolynn Fall Risk and appropriate interventions in the flowsheet.   Outcome: Progressing Towards Goal  Note: Fall Risk Interventions:  Mobility Interventions: Bed/chair exit alarm    Mentation Interventions: Adequate sleep, hydration, pain control, Bed/chair exit alarm, Door open when patient unattended    Medication Interventions: Bed/chair exit alarm    Elimination Interventions: Bed/chair exit alarm, Call light in reach, Patient to call for help with toileting needs, Stay With Me (per policy), Toileting schedule/hourly rounds    History of Falls Interventions: Bed/chair exit alarm         Problem: Patient Education: Go to Patient Education Activity  Goal: Patient/Family Education  Outcome: Progressing Towards Goal     Problem: Small Bowel Obstruction: Day 1  Goal: Off Pathway (Use only if patient is Off Pathway)  Outcome: Progressing Towards Goal  Goal: Activity/Safety  Outcome: Progressing Towards Goal  Goal: Consults, if ordered  Outcome: Progressing Towards Goal  Goal: Diagnostic Test/Procedures  Outcome: Progressing Towards Goal  Goal: Nutrition/Diet  Outcome: Progressing Towards Goal  Goal: Medications  Outcome: Progressing Towards Goal  Goal: Respiratory  Outcome: Progressing Towards Goal  Goal: Treatments/Interventions/Procedures  Outcome: Progressing Towards Goal  Goal: Psychosocial  Outcome: Progressing Towards Goal  Goal: *Optimal pain control at patient's stated goal  Outcome: Progressing Towards Goal  Goal: *Adequate urinary output (equal to or greater than 30 milliliters/hour)  Outcome: Progressing Towards Goal  Goal: *Hemodynamically stable  Outcome: Progressing Towards Goal  Goal: *Demonstrates progressive activity  Outcome: Progressing Towards Goal  Goal: *Absence of nausea/vomiting  Outcome: Progressing Towards Goal     Problem: Small Bowel Obstruction: Day 2  Goal: Off Pathway (Use only if patient is Off Pathway)  Outcome: Progressing Towards Goal  Goal: Activity/Safety  Outcome: Progressing Towards Goal  Goal: Consults, if ordered  Outcome: Progressing Towards Goal  Goal: Diagnostic Test/Procedures  Outcome: Progressing Towards Goal  Goal: Nutrition/Diet  Outcome: Progressing Towards Goal  Goal: Discharge Planning  Outcome: Progressing Towards Goal  Goal: Medications  Outcome: Progressing Towards Goal  Goal: Respiratory  Outcome: Progressing Towards Goal  Goal: Treatments/Interventions/Procedures  Outcome: Progressing Towards Goal  Goal: Psychosocial  Outcome: Progressing Towards Goal  Goal: *Optimal pain control at patient's stated goal  Outcome: Progressing Towards Goal  Goal: *Adequate urinary output (equal to or greater than 30 milliliters/hour)  Outcome: Progressing Towards Goal  Goal: *Hemodynamically stable  Outcome: Progressing Towards Goal  Goal: *Demonstrates progressive activity  Outcome: Progressing Towards Goal  Goal: *Absence of nausea/vomiting  Outcome: Progressing Towards Goal  Goal: *Return of normal bowel function  Outcome: Progressing Towards Goal     Problem: Small Bowel Obstruction: Day 3  Goal: Off Pathway (Use only if patient is Off Pathway)  Outcome: Progressing Towards Goal  Goal: Activity/Safety  Outcome: Progressing Towards Goal  Goal: Consults, if ordered  Outcome: Progressing Towards Goal  Goal: Diagnostic Test/Procedures  Outcome: Progressing Towards Goal  Goal: Nutrition/Diet  Outcome: Progressing Towards Goal  Goal: Discharge Planning  Outcome: Progressing Towards Goal  Goal: Medications  Outcome: Progressing Towards Goal  Goal: Respiratory  Outcome: Progressing Towards Goal  Goal: Treatments/Interventions/Procedures  Outcome: Progressing Towards Goal  Goal: Psychosocial  Outcome: Progressing Towards Goal  Goal: *Optimal pain control at patient's stated goal  Outcome: Progressing Towards Goal  Goal: *Adequate urinary output (equal to or greater than 30 milliliters/hour)  Outcome: Progressing Towards Goal  Goal: *Hemodynamically stable  Outcome: Progressing Towards Goal  Goal: *Adequate nutrition  Outcome: Progressing Towards Goal  Goal: *Demonstrates progressive activity  Outcome: Progressing Towards Goal  Goal: *Participates in discharge planning  Outcome: Progressing Towards Goal     Problem: Small Bowel Obstruction: Day 4 to Discharge  Goal: Off Pathway (Use only if patient is Off Pathway)  Outcome: Progressing Towards Goal  Goal: Activity/Safety  Outcome: Progressing Towards Goal  Goal: Nutrition/Diet  Outcome: Progressing Towards Goal  Goal: Discharge Planning  Outcome: Progressing Towards Goal  Goal: Medications  Outcome: Progressing Towards Goal  Goal: Respiratory  Outcome: Progressing Towards Goal  Goal: Treatments/Interventions/Procedures  Outcome: Progressing Towards Goal  Goal: Psychosocial  Outcome: Progressing Towards Goal     Problem: Small Bowel Obstruction - Non Surgical: Discharge Outcomes  Goal: *Hemodynamically stable  Outcome: Progressing Towards Goal  Goal: *Demonstrates independent activity or return to baseline  Outcome: Progressing Towards Goal  Goal: *Optimal pain control at patient's stated goal  Outcome: Progressing Towards Goal  Goal: *Verbalizes understanding and describes prescribed diet  Outcome: Progressing Towards Goal  Goal: *Tolerating diet  Outcome: Progressing Towards Goal  Goal: *Verbalizes name, dosage, time, side effects, and number of days to continue medications  Outcome: Progressing Towards Goal  Goal: *Anxiety reduced or absent  Outcome: Progressing Towards Goal  Goal: *Understands and describes signs and symptoms to report to providers(Stroke Metric)  Outcome: Progressing Towards Goal  Goal: *Describes follow-up/return visits to physicians  Outcome: Progressing Towards Goal  Goal: *Describes available resources and support systems  Outcome: Progressing Towards Goal  Goal: *Active bowel function  Outcome: Progressing Towards Goal

## 2020-01-29 NOTE — PROGRESS NOTES
OCCUPATIONAL THERAPY EVALUATION/DISCHARGE  Patient: Jamal Kennedy (20 y.o. female)  Date: 1/29/2020  Primary Diagnosis: Diarrhea [R19.7]       Precautions: fall, NPO       ASSESSMENT  Based on the objective data described below, the patient presents with near baseline level of functioning for adls and functional mobility. Pt has been in bed since admission, and was happy to be up OOB. Pt demonstrated good balance this date, she does report a couple of falls at home (3)  and reports that she loses her balance which causes a fall. Pt has family close by and they check on her regularly. Pt also visits her son in [place]. Currently pt is requiring O2 2 L during activity and decreased into mid 80s on room air. She appears dyspneic during activities. .    Current Level of Function (ADLs/self-care): generally supervision to independent level    Functional Outcome Measure: The patient scored 75/100 on the Barthel INdex outcome measure which is indicative of near independent functioning. .      Other factors to consider for discharge: may benefit from additional family support as she acclimates to returning to her usual adl routine. PLAN :  Recommend with staff: faciliate time up OOB and participation in self care activities as appropriate. Recommendation for discharge: (in order for the patient to meet his/her long term goals)  No skilled occupational therapy/ follow up rehabilitation needs identified at this time. This discharge recommendation:  Has not yet been discussed the attending provider and/or case management    IF patient discharges home will need the following DME: likely none       SUBJECTIVE:   Patient stated My daughter checks in on me.   \"I've been trying to gain weight\"  (had surgery on her stomach )  OBJECTIVE DATA SUMMARY:   HISTORY:   Past Medical History:   Diagnosis Date    Arthritis     Perforated bowel ([address]     Sarcoidosis      Past Surgical History:   Procedure Laterality Date  HX HERNIA REPAIR         Prior Level of Function/Environment/Context: Pt reports that she lives alone, her family checks in on her. Pt drives to grocery shop and if going to a large store uses the motorized cart. Pt reports independence   Expanded or extensive additional review of patient history:   Home Situation  Home Environment: Private residence  # Steps to Enter: 3  Rails to Enter: Yes  Hand Rails : Bilateral  One/Two Story Residence: One story  Living Alone: Yes  Support Systems: Child(radhika)  Patient Expects to be Discharged to[de-identified] Private residence  Current DME Used/Available at Home: Walker, rolling  Tub or Shower Type: Tub/Shower combination    Hand dominance: Right    EXAMINATION OF PERFORMANCE DEFICITS:  Cognitive/Behavioral Status:  Neurologic State: Alert  Orientation Level: Oriented to person;Oriented to place  Cognition: Follows commands  Perception: Appears intact  Perseveration: No perseveration noted  Safety/Judgement: Awareness of environment; Fall prevention;Home safety    Skin: generally intact, IV and O2 on 2L    Edema: none observed    Hearing: Auditory  Auditory Impairment: None    Vision/Perceptual:                                Corrective Lenses: (none present, pt reports that she's had corrective eye surge)    Range of Motion:  BUEs    AROM: Within functional limits                         Strength:  BUEs:    Strength: Within functional limits                Coordination:  Coordination: Within functional limits  Fine Motor Skills-Upper: Left Intact; Right Intact(pt has arthritic joint changes bilaterally)    Gross Motor Skills-Upper: Left Intact; Right Intact    Tone & Sensation:    Tone: Normal  Sensation: Intact                      Balance:  Sitting: Intact  Standing: Intact    Functional Mobility and Transfers for ADLs:  Bed Mobility:  Rolling: Independent  Supine to Sit: Independent  Sit to Supine: Independent  Scooting: Independent    Transfers:  Sit to Stand: Modified independent  Stand to Sit: Modified independent  Bed to Chair: Supervision  Bathroom Mobility: Supervision/set up  Toilet Transfer : Supervision;Stand-by assistance  Assistive Device : (grab bar)    ADL Assessment:  Feeding: (pt is NPO at this time due to medical condition)    Oral Facial Hygiene/Grooming: Supervision;Stand-by assistance    Bathing: Supervision;Stand-by assistance    Upper Body Dressing: Setup    Lower Body Dressing: Setup    Toileting: Stand by assistance                ADL Intervention and task modifications:   Pt ambulated into the bathroom and performed toilet transfer and standing grooming with superivsion. Mild LOB posteriorly while managing depends at toilet. Pt continent at this time, she reports that she sometimes is incontinent. She is able to manage her adls independently at baseline. Pt would greatly benefit from OOB activities to maintain strength and endurance while hospitalized for her medical condition. Cognitive Retraining  Safety/Judgement: Awareness of environment; Fall prevention;Home safety    Therapeutic Exercise:  Encouraged OOB often and call for assist for toileting in bathroom. Encouraged seated exercises   Functional Measure:  Barthel Index:    Bathin  Bladder: 5  Bowels: 10  Groomin  Dressing: 10  Feedin(pt is NPO)  Mobility: 15  Stairs: 5  Toilet Use: 10  Transfer (Bed to Chair and Back): 10  Total: 75/100        The Barthel ADL Index: Guidelines  1. The index should be used as a record of what a patient does, not as a record of what a patient could do. 2. The main aim is to establish degree of independence from any help, physical or verbal, however minor and for whatever reason. 3. The need for supervision renders the patient not independent. 4. A patient's performance should be established using the best available evidence.  Asking the patient, friends/relatives and nurses are the usual sources, but direct observation and common sense are also important. However direct testing is not needed. 5. Usually the patient's performance over the preceding 24-48 hours is important, but occasionally longer periods will be relevant. 6. Middle categories imply that the patient supplies over 50 per cent of the effort. 7. Use of aids to be independent is allowed. Elida Messina., Barthel, D.W. (7013). Functional evaluation: the Barthel Index. [address] (14)2. Sandria Cogan puneet DANAY Mccarthy, Allison Flores, Lissy Angeles, Ky Cruz, [facility] (1999). Measuring the change indisability after inpatient rehabilitation; comparison of the responsiveness of the Barthel Index and Functional Hardwick Measure. Journal of Neurology, Neurosurgery, and Psychiatry, 66(4), 738-432. MARY JANE White, SURYA Yoder, & Ceferino Booker, MRkA. (2004.) Assessment of post-stroke quality of life in cost-effectiveness studies: The usefulness of the Barthel Index and the EuroQoL-5D. Quality of Life Research, 15, 395-91       Occupational Therapy Evaluation Charge Determination   History Examination Decision-Making   LOW Complexity : Brief history review  MEDIUM Complexity : 3-5 performance deficits relating to physical, cognitive , or psychosocial skils that result in activity limitations and / or participation restrictions MEDIUM Complexity : Patient may present with comorbidities that affect occupational performnce. Miniml to moderate modification of tasks or assistance (eg, physical or verbal ) with assesment(s) is necessary to enable patient to complete evaluation       Based on the above components, the patient evaluation is determined to be of the following complexity level: LOW   Pain Rating:  none    Activity Tolerance:   Pt was on 2L nasal cannula during evaluation, as O2 sats had dropped into the mid 80s on room air. Pt reports no O2 use at home, however, appears dyspneic during adls/mobility.    Please refer to the flowsheet for vital signs taken during this treatment. After treatment patient left in no apparent distress:    Sitting in chair, Call bell within reach and nurse informed    COMMUNICATION/EDUCATION:   The patients plan of care was discussed with: Physical Therapist and Registered Nurse.     Thank you for this referral.  JHOAN Parker/LEMUEL  Time Calculation: 28 mins

## 2020-01-29 NOTE — PROGRESS NOTES
Hospitalist Progress Note    NAME: Jamal Kennedy   :  1935   MRN:  [id number]       Assessment / Plan:  Colitis POA- presumed infectious  Causing abdominal pain with diarrhea and vomiting POA- improved now   Suspected Bowel obstruction - ruled out on CT in ER  Gastric Distention POA  Lactic Acidosis (2.1->3. 2) POA- resolved now at 1.2  CDiff test negative  CT abdomen was done and showed significantly dilated stomach  lactate    NPO for now - advance when ok with surgery  Pt initially refused NGT placement for evident Gastric Dilatation on CT in ER- convinced by Gen Surgery Dr Heaven Gonzales yesterday- s/p NGT placement on the Floor yesterday  Repeat KUB today AM for follow up  IP Gen Surgery Consult appreciated- follow up today for ? Removal of NGT if xray improved  Follow-up stool study  DC enteric precautions due to C Diff as test neg  Cont empiric antibiotic therapy IV Flagyl and Levaquin for now  Cont IVF - change to D5 1/2 NS for rising Na+, pt NPO    suspected UTI  POA  Cont IV antibiotic Levaquin for now   Follow-up urine culture- still pending     History of sarcoidosis  Patient currently not on any medication     Acute renal failure POA-likely pre renal due to dehydration, resolved now  S/p aggressive IVF in past 24 hrs  Cr now normalized at 0.9      less than 18.5 Underweight / Body mass index is 16.99 kg/m². Nutrition consulted - recc appreciated    Code status: Full   Surrogate Decision Maker:Namrata Angeles (local), [place] [phone]  Prophylaxis: Hep SQ  Recommended Disposition: Home w/Family     Subjective:     Chief Complaint / Reason for Physician Visit :F/U   \"I want to eat soon\". Discussed with RN events overnight.      Review of Systems:  Symptom Y/N Comments  Symptom Y/N Comments   Fever/Chills n   Chest Pain n    Poor Appetite n   Edema n    Cough n   Abdominal Pain n Improved s/p NGT yesterday   Sputum n   Joint Pain n    SOB/HAYDEN n Pruritis/Rash n    Nausea/vomit n   Tolerating PT/OT y    Diarrhea n   Tolerating Diet n NPO   Constipation    Other       Could NOT obtain due to:      Objective:     VITALS:   Last 24hrs VS reviewed since prior progress note. Most recent are:  Patient Vitals for the past 24 hrs:   Temp Pulse Resp BP SpO2   01/29/20 0743 97.7 °F (36.5 °C) 82 20 134/79 95 %   01/29/20 0357 98 °F (36.7 °C) 83 22 123/78 99 %   01/28/20 2301 97.7 °F (36.5 °C) 86 24 127/83 100 %   01/28/20 2220   24  97 %   01/28/20 1943 98.7 °F (37.1 °C) 81 16 128/73 100 %   01/28/20 1717     97 %   01/28/20 1536 98.2 °F (36.8 °C) 88 20 118/76 (!) 89 %   01/28/20 1147 98 °F (36.7 °C) 88 20 113/65 100 %     No intake or output data in the 24 hours ending 01/29/20 0856     PHYSICAL EXAM:  General: WD, WN. Alert, cooperative, no acute distress, NGT + noted   EENT:  EOMI. Anicteric sclerae. MMM  Resp:  CTA bilaterally, no wheezing or rales. No accessory muscle use  CV:  Regular  rhythm,  No edema  GI:  Soft, Non distended, Non tender.  +Bowel sounds  Neurologic:  Alert and oriented X 3, normal speech,   Psych:   Good insight. Not anxious nor agitated  Skin:  No rashes. No jaundice    Reviewed most current lab test results and cultures  YES  Reviewed most current radiology test results   YES  Review and summation of old records today    NO  Reviewed patient's current orders and MAR    YES  PMH/ reviewed - no change compared to H&P  ________________________________________________________________________  Care Plan discussed with:    Comments   Patient x    Family  x At bedside   RN x    Care Manager x    Consultant                       x Multidiciplinary team rounds were held today with , nursing, pharmacist and clinical coordinator. Patient's plan of care was discussed; medications were reviewed and discharge planning was addressed.      ________________________________________________________________________  Total NON critical care TIME:  36   Minutes    Total CRITICAL CARE TIME Spent:   Minutes non procedure based      Comments   >50% of visit spent in counseling and coordination of care     ________________________________________________________________________  Sarah Nieto MD     Procedures: see electronic medical records for all procedures/Xrays and details which were not copied into this note but were reviewed prior to creation of Plan. LABS:  I reviewed today's most current labs and imaging studies.   Pertinent labs include:  Recent Labs     01/29/20 0315 01/27/20  2302   WBC 11.2* 10.9   HGB 8.9* 12.0   HCT 28.3* 38.4    224     Recent Labs     01/29/20 0315 01/27/20  2302   * 142   K 4.0 3.9   * 109*   CO2 24 27   * 102*   BUN 17 24*   CREA 0.91 1.41*   CA 6.9* 9.3   ALB  --  4.1   TBILI  --  0.6   SGOT  --  31   ALT  --  25       Signed: Sarah Nieto MD

## 2020-01-30 LAB
ANION GAP SERPL CALC-SCNC: 4 MMOL/L (ref 5–15)
BACTERIA SPEC CULT: ABNORMAL
BUN SERPL-MCNC: 9 MG/DL (ref 6–20)
BUN/CREAT SERPL: 10 (ref 12–20)
CALCIUM SERPL-MCNC: 6.6 MG/DL (ref 8.5–10.1)
CC UR VC: ABNORMAL
CHLORIDE SERPL-SCNC: 111 MMOL/L (ref 97–108)
CO2 SERPL-SCNC: 26 MMOL/L (ref 21–32)
CREAT SERPL-MCNC: 0.86 MG/DL (ref 0.55–1.02)
ERYTHROCYTE [DISTWIDTH] IN BLOOD BY AUTOMATED COUNT: 13.4 % (ref 11.5–14.5)
GLUCOSE SERPL-MCNC: 100 MG/DL (ref 65–100)
HCT VFR BLD AUTO: 28.5 % (ref 35–47)
HGB BLD-MCNC: 9 G/DL (ref 11.5–16)
MAGNESIUM SERPL-MCNC: 1.3 MG/DL (ref 1.6–2.4)
MCH RBC QN AUTO: 30.2 PG (ref 26–34)
MCHC RBC AUTO-ENTMCNC: 31.6 G/DL (ref 30–36.5)
MCV RBC AUTO: 95.6 FL (ref 80–99)
NRBC # BLD: 0 K/UL (ref 0–0.01)
NRBC BLD-RTO: 0 PER 100 WBC
PHOSPHATE SERPL-MCNC: 1.5 MG/DL (ref 2.6–4.7)
PLATELET # BLD AUTO: 157 K/UL (ref 150–400)
PMV BLD AUTO: 11.2 FL (ref 8.9–12.9)
POTASSIUM SERPL-SCNC: 3.4 MMOL/L (ref 3.5–5.1)
RBC # BLD AUTO: 2.98 M/UL (ref 3.8–5.2)
SERVICE CMNT-IMP: ABNORMAL
SODIUM SERPL-SCNC: 141 MMOL/L (ref 136–145)
WBC # BLD AUTO: 11.8 K/UL (ref 3.6–11)

## 2020-01-30 PROCEDURE — 83735 ASSAY OF MAGNESIUM: CPT

## 2020-01-30 PROCEDURE — 74011250636 HC RX REV CODE- 250/636: Performed by: INTERNAL MEDICINE

## 2020-01-30 PROCEDURE — 36415 COLL VENOUS BLD VENIPUNCTURE: CPT

## 2020-01-30 PROCEDURE — 65660000000 HC RM CCU STEPDOWN

## 2020-01-30 PROCEDURE — 74011250637 HC RX REV CODE- 250/637: Performed by: INTERNAL MEDICINE

## 2020-01-30 PROCEDURE — 80048 BASIC METABOLIC PNL TOTAL CA: CPT

## 2020-01-30 PROCEDURE — 84100 ASSAY OF PHOSPHORUS: CPT

## 2020-01-30 PROCEDURE — 74011000258 HC RX REV CODE- 258: Performed by: INTERNAL MEDICINE

## 2020-01-30 PROCEDURE — 85027 COMPLETE CBC AUTOMATED: CPT

## 2020-01-30 PROCEDURE — 74011000250 HC RX REV CODE- 250: Performed by: INTERNAL MEDICINE

## 2020-01-30 PROCEDURE — 77010033678 HC OXYGEN DAILY

## 2020-01-30 PROCEDURE — 94760 N-INVAS EAR/PLS OXIMETRY 1: CPT

## 2020-01-30 RX ORDER — MAGNESIUM SULFATE 1 G/100ML
1 INJECTION INTRAVENOUS ONCE
Status: COMPLETED | OUTPATIENT
Start: 2020-01-30 | End: 2020-01-31

## 2020-01-30 RX ADMIN — Medication 10 ML: at 22:10

## 2020-01-30 RX ADMIN — LEVOFLOXACIN 250 MG: 5 INJECTION, SOLUTION INTRAVENOUS at 12:14

## 2020-01-30 RX ADMIN — METRONIDAZOLE 500 MG: 500 INJECTION, SOLUTION INTRAVENOUS at 08:30

## 2020-01-30 RX ADMIN — HEPARIN SODIUM 5000 UNITS: 5000 INJECTION INTRAVENOUS; SUBCUTANEOUS at 05:27

## 2020-01-30 RX ADMIN — METRONIDAZOLE 500 MG: 500 INJECTION, SOLUTION INTRAVENOUS at 22:09

## 2020-01-30 RX ADMIN — MAGNESIUM SULFATE HEPTAHYDRATE 1 G: 1 INJECTION, SOLUTION INTRAVENOUS at 10:18

## 2020-01-30 RX ADMIN — DEXTROSE MONOHYDRATE AND SODIUM CHLORIDE 75 ML/HR: 5; .45 INJECTION, SOLUTION INTRAVENOUS at 22:09

## 2020-01-30 RX ADMIN — ACETAMINOPHEN 650 MG: 650 SUPPOSITORY RECTAL at 14:25

## 2020-01-30 RX ADMIN — ACETAMINOPHEN 650 MG: 650 SUPPOSITORY RECTAL at 08:38

## 2020-01-30 RX ADMIN — HEPARIN SODIUM 5000 UNITS: 5000 INJECTION INTRAVENOUS; SUBCUTANEOUS at 17:41

## 2020-01-30 RX ADMIN — Medication 10 ML: at 15:12

## 2020-01-30 RX ADMIN — SODIUM CHLORIDE: 900 INJECTION, SOLUTION INTRAVENOUS at 12:05

## 2020-01-30 RX ADMIN — SODIUM CHLORIDE: 900 INJECTION, SOLUTION INTRAVENOUS at 15:11

## 2020-01-30 NOTE — PROGRESS NOTES
Surgical Note    Date/Time:  2020 9:00 AM        Assessment :    Plan:  Patient Active Problem List   Diagnosis Code    Diarrhea R19.7     Enteritis, improving     tolerating NGT out  Feeling ok  abd benign (just with the chronic epigastric tenderness, has met with surgeon --Andrae Padilla MD--   at [facility] to considering elective exploration)    Has a headache. Wants to try some coffee    estefania a few sips yesterday  Recommend clears and adv as tolerated  OOB for all meals           Subjective:     Chief Complaint:      Review of Systems:  Y  N       Y  N  [] [x]  Fever/chills                                               [] [x]  Chest Pain  [] [x]  Cough                                                       [] [x]  Diarrhea   [] [x]  Sputum                                                     [] [x]  Constipation  [] [x]  SOB/HAYDEN                                                [] [x]  Nausea/Vomit  [] [x]  Abd Pain                                                    [] [x]  Tolerating diet  [] [x]  Dysuria                                                           []Unable to obtain  ROS due to  []mental status change  []sedated   []intubated    Objective:     VITALS:   Last 24hrs VS reviewed since prior hospitalist progress note.  Most recent are:  Visit Vitals  /86   Pulse 73   Temp 98.3 °F (36.8 °C)   Resp 20   Ht 5' (1.524 m)   Wt 39.5 kg (87 lb)   SpO2 100%   Breastfeeding No   BMI 16.99 kg/m²     Temp (24hrs), Av.4 °F (36.9 °C), Min:98 °F (36.7 °C), Max:99.6 °F (37.6 °C)      Intake/Output Summary (Last 24 hours) at 2020 0900  Last data filed at 2020 1208  Gross per 24 hour   Intake    Output 150 ml   Net -150 ml         []Mcclellan []NGT  []Intubated on vent    PHYSICAL EXAM:  Gen:  [] A&O  []non-toxic  [x] No acute distress             [] ill apearing  []  Critical        HEENT:   [x]NC/AT/PERRLA/EOMI    []pink conjunctivae      []pale conjunctivae                  PERRL []yes  []no      []moist mucosa    []dry mucosa    hearing intact to voice []yes  []No    RESP:   [x]CTA bialterally/no wheezing/rhonchi/rales/crackles    []clear bilaterally  []wheezing   []rhonchi   []crackles     use of accessory muscles []yes []no    CARD:   [x] regular rate and rhythm/No murmurs/rubs/gallops    murmur  []yes ()  []no      Rubs  []yes  []no       Gallops []yes  []no    Rate [] regular  [] irregular        carotid bruits  []Right  [] Left                 LE edema []yes  []no           JVP  [] yes   [] no    ABD:    []soft  []non distended  []non tender  [] NABS    SKIN:   Rashes [] yes   [x] no    Ulcers [] yes   [x] no               [x]normal   []tight to palpitation    skin turgor [] good  []poor  []decreased               Cyanosis/clubbing [] yes [x] no    NEUR:   [x]cranial nerves II-XII grossly intact       []Cranial nerves deficit                 [] facial droop    [] slurred speech   []aphasic     []Strength normal     [] weakness  [] LUE  []  RUE/ [] LLE  []  RLE    follows commands  [] yes [] no           PSYCH:   insight []poor [x]good   Alert and Oriented to  [x]person  [x]place  [x] time                    []depressed []anxious []agitated  []lethargic []stuporous  []sedated           Lab Data Reviewed: (see below)    Medications Reviewed: (see below)    PMH/SH reviewed - no change compared to H&P    Care Plan discussed with:  [x]Patient   []Family    []Care Manager   []RN    []Consultant/Specialist :    Prophylaxis:  []Lovenox  []Coumadin  []Hep SQ  []SCDs  []H2B/PPI    Disposition:  []Home w/ Family   []HH PT,OT,RN   []SNF/LTC   []SAH/Rehab    Ancillary Serices:   [] PT     []OT      []SW      []Nut      []HH ________________________________________________________________________  LABS:  Recent Labs     01/30/20 0342 01/29/20 0315   WBC 11.8* 11.2*   HGB 9.0* 8.9*   HCT 28.5* 28.3*    153     Recent Labs     01/30/20 0342 01/29/20 0315 01/27/20  2302    148* 142 K 3.4* 4.0 3.9   * 120* 109*   CO2 26 24 27   BUN 9 17 24*   CREA 0.86 0.91 1.41*    135* 102*   CA 6.6* 6.9* 9.3   MG 1.3*  --   --    PHOS 1.5*  --   --      Recent Labs     01/27/20  2302   SGOT 31   ALT 25   AP 74   TBILI 0.6   TP 8.1   ALB 4.1   GLOB 4.0   LPSE 337     No results for input(s): INR, PTP, APTT, INREXT in the last 72 hours. No results for input(s): FE, TIBC, PSAT, FERR in the last 72 hours. No results for input(s): PH, PCO2, PO2 in the last 72 hours.   Recent Labs     01/27/20  2302        No results found for: GLUCPOC    MEDICATIONS:  Current Facility-Administered Medications   Medication Dose Route Frequency    magnesium sulfate 1 g/100 ml IVPB (premix or compounded)  1 g IntraVENous ONCE    potassium phosphate 30 mmol in 0.9% sodium chloride 250 mL infusion   IntraVENous ONCE    dextrose 5 % - 0.45% NaCl infusion  75 mL/hr IntraVENous CONTINUOUS    levoFLOXacin (LEVAQUIN) 250 mg in D5W IVPB  250 mg IntraVENous Q24H    sodium chloride (NS) flush 5-40 mL  5-40 mL IntraVENous Q8H    sodium chloride (NS) flush 5-40 mL  5-40 mL IntraVENous PRN    acetaminophen (TYLENOL) suppository 650 mg  650 mg Rectal Q6H PRN    ondansetron (ZOFRAN) injection 4 mg  4 mg IntraVENous Q6H PRN    bisacodyL (DULCOLAX) suppository 10 mg  10 mg Rectal DAILY PRN    heparin (porcine) injection 5,000 Units  5,000 Units SubCUTAneous Q12H    lidocaine (XYLOCAINE) 2 % viscous solution 15 mL  15 mL Mouth/Throat PRN    metroNIDAZOLE (FLAGYL) IVPB premix 500 mg  500 mg IntraVENous Q12H    morphine injection 2 mg  2 mg IntraVENous Q4H PRN

## 2020-01-30 NOTE — PROGRESS NOTES
Hospitalist Progress Note    NAME: Angelita Richard   :  1935   MRN:  [id number]       Assessment / Plan:  Colitis POA- presumed infectious  Causing abdominal pain with diarrhea and vomiting POA- improved now   Suspected Bowel obstruction - ruled out on CT in ER  Gastric Distention with ileus POA  Lactic Acidosis (2.1->3. 2) POA- resolved now at 1.2  CDiff test negative  CT abdomen was done and showed significantly dilated stomach  Lactate  Repeat KUB ()=   There are multiple air-fluid levels in the left hemiabdomen on decubitus radiography. No  pneumoperitoneum    NPO for now - advance only when ok with surgery- pt & family understands  Pt initially refused NGT placement for evident Gastric Dilatation on CT in ER- convinced by Gen Surgery Dr Jose Enrique Groves yesterday- s/p NGT placement on the Floor   IP Gen Surgery Consult appreciated-s/p Removal of NGT yesterday by surgery  Follow-up stool study  DCd enteric precautions due to C Diff as test neg  Cont empiric antibiotic therapy IV Flagyl and Levaquin for now  Cont D5 / NS + K+ for now  Replenish Electrolytes- Kphos IV x 1 , IV Mag x 1 today  Check levels in AM    suspected UTI  POA  Cont IV antibiotic Levaquin for now   Follow-up urine culture- growing Gram neg rods >100k colonies     History of sarcoidosis  Patient currently not on any medication     Acute renal failure POA-likely pre renal due to dehydration, resolved now  S/p aggressive IVF in past 24 hrs  Cr now normalized at 0.9      less than 18.5 Underweight / Body mass index is 16.99 kg/m². Nutrition consulted - recc appreciated    Code status: Full   Surrogate Decision Maker:Namrata Angeles (local), son is a pediatrician in [place], Bibi Sergio [phone]  Prophylaxis: Hep SQ  Recommended Disposition: Home w/Family     Subjective:     Chief Complaint / Reason for Physician Visit :F/U ileus, Colitis, dyselectrolytemia, distended stomach  \"I want to eat soon\".   Discussed with RN irma overnight. Review of Systems:  Symptom Y/N Comments  Symptom Y/N Comments   Fever/Chills n   Chest Pain n    Poor Appetite n   Edema n    Cough n   Abdominal Pain n NGT out now   Sputum n   Joint Pain n    SOB/HAYDEN n   Pruritis/Rash n    Nausea/vomit n   Tolerating PT/OT y    Diarrhea n   Tolerating Diet n NPO   Constipation    Other       Could NOT obtain due to:      Objective:     VITALS:   Last 24hrs VS reviewed since prior progress note. Most recent are:  Patient Vitals for the past 24 hrs:   Temp Pulse Resp BP SpO2   01/30/20 0821 98.3 °F (36.8 °C) 73 20 132/86 100 %   01/30/20 0343 99.6 °F (37.6 °C) 78 18 121/82 98 %   01/29/20 2345 98.5 °F (36.9 °C) 78 18 127/73 98 %   01/29/20 1934 98 °F (36.7 °C) 80 20 (!) 141/91 100 %   01/29/20 1536 98.1 °F (36.7 °C) 78 20 111/72 98 %   01/29/20 1223  88   (!) 85 %   01/29/20 1121 98 °F (36.7 °C) 83 20 124/89 97 %       Intake/Output Summary (Last 24 hours) at 1/30/2020 0900  Last data filed at 1/29/2020 1208  Gross per 24 hour   Intake    Output 150 ml   Net -150 ml        PHYSICAL EXAM:  General: WD, WN. Alert, cooperative, no acute distress, NGT out now   EENT:  EOMI. Anicteric sclerae. MMM  Resp:  CTA bilaterally, no wheezing or rales. No accessory muscle use  CV:  Regular  rhythm,  No edema  GI:  Soft, Non distended, Non tender.  +Bowel sounds  Neurologic:  Alert and oriented X 3, normal speech,   Psych:   Good insight. Not anxious nor agitated  Skin:  No rashes.   No jaundice    Reviewed most current lab test results and cultures  YES  Reviewed most current radiology test results   YES  Review and summation of old records today    NO  Reviewed patient's current orders and MAR    YES  PMH/SH reviewed - no change compared to H&P  ________________________________________________________________________  Care Plan discussed with:    Comments   Patient x    Family  x Son in [place] On the phone   RN x    Care Manager x    Consultant                       x Multidiciplinary team rounds were held today with , nursing, pharmacist and clinical coordinator. Patient's plan of care was discussed; medications were reviewed and discharge planning was addressed. ________________________________________________________________________  Total NON critical care TIME:  26   Minutes    Total CRITICAL CARE TIME Spent:   Minutes non procedure based      Comments   >50% of visit spent in counseling and coordination of care     ________________________________________________________________________  Eusebio Bedolla MD     Procedures: see electronic medical records for all procedures/Xrays and details which were not copied into this note but were reviewed prior to creation of Plan. LABS:  I reviewed today's most current labs and imaging studies.   Pertinent labs include:  Recent Labs     01/30/20 0342 01/29/20  0315 01/27/20  2302   WBC 11.8* 11.2* 10.9   HGB 9.0* 8.9* 12.0   HCT 28.5* 28.3* 38.4    153 224     Recent Labs     01/30/20  0342 01/29/20  0315 01/27/20  2302    148* 142   K 3.4* 4.0 3.9   * 120* 109*   CO2 26 24 27    135* 102*   BUN 9 17 24*   CREA 0.86 0.91 1.41*   CA 6.6* 6.9* 9.3   MG 1.3*  --   --    PHOS 1.5*  --   --    ALB  --   --  4.1   TBILI  --   --  0.6   SGOT  --   --  31   ALT  --   --  25       Signed: Eusebio Bedolla MD

## 2020-01-30 NOTE — PROGRESS NOTES
IRVING Plan:    * Home with f/u appts vs home with HH    > CM provided list of [place] providers to pt at bedside for review  > Pt will need f/u appts secured prior to d/c  > 2nd IM & FOC prior to d/c    Initial note: CM reviewed pt's chart and noted updates prior to moving forward with d/c planning. CM met with pt at bedside to check in, introduce role, and discuss [place] plans for d/c. CM discussed utilizing [place] services at d/c; pt reported \"I wish my daughter could do it. \" CM acknowledged pt input and urged her to review list of [place] providers as a potential back-up plan in case her daughter is unable to provide support necessary at d/c. Pt verbalized understanding and requested CM contact her daughter Sulema Hussein: [phone]) to discuss [place] plans more in depth    CM contacted pt's daughter to discuss [place] plans. Pt's daughter believes pt will benefit from [place] services and reported she will talk to her to reiterate the importance of utilizing intervention. CM informed pt's daughter that list of [place] providers was provided to pt at bedside for review; pt's daughter verbalized understanding. CM will continue to follow patient for discharge planning needs and arrange for services as deemed necessary.     Gloria Castillo, MSW  Care Manager, [facility]

## 2020-01-31 LAB
ANION GAP SERPL CALC-SCNC: 4 MMOL/L (ref 5–15)
BUN SERPL-MCNC: 5 MG/DL (ref 6–20)
BUN/CREAT SERPL: 6 (ref 12–20)
CALCIUM SERPL-MCNC: 6.8 MG/DL (ref 8.5–10.1)
CHLORIDE SERPL-SCNC: 111 MMOL/L (ref 97–108)
CO2 SERPL-SCNC: 28 MMOL/L (ref 21–32)
CREAT SERPL-MCNC: 0.77 MG/DL (ref 0.55–1.02)
ERYTHROCYTE [DISTWIDTH] IN BLOOD BY AUTOMATED COUNT: 13.2 % (ref 11.5–14.5)
GLUCOSE SERPL-MCNC: 92 MG/DL (ref 65–100)
HCT VFR BLD AUTO: 30.3 % (ref 35–47)
HGB BLD-MCNC: 9.8 G/DL (ref 11.5–16)
MAGNESIUM SERPL-MCNC: 1.5 MG/DL (ref 1.6–2.4)
MCH RBC QN AUTO: 30.3 PG (ref 26–34)
MCHC RBC AUTO-ENTMCNC: 32.3 G/DL (ref 30–36.5)
MCV RBC AUTO: 93.8 FL (ref 80–99)
NRBC # BLD: 0 K/UL (ref 0–0.01)
NRBC BLD-RTO: 0 PER 100 WBC
PHOSPHATE SERPL-MCNC: 2 MG/DL (ref 2.6–4.7)
PLATELET # BLD AUTO: 173 K/UL (ref 150–400)
PMV BLD AUTO: 11 FL (ref 8.9–12.9)
POTASSIUM SERPL-SCNC: 3.5 MMOL/L (ref 3.5–5.1)
RBC # BLD AUTO: 3.23 M/UL (ref 3.8–5.2)
SODIUM SERPL-SCNC: 143 MMOL/L (ref 136–145)
WBC # BLD AUTO: 8.6 K/UL (ref 3.6–11)

## 2020-01-31 PROCEDURE — 74011250637 HC RX REV CODE- 250/637: Performed by: SURGERY

## 2020-01-31 PROCEDURE — 85027 COMPLETE CBC AUTOMATED: CPT

## 2020-01-31 PROCEDURE — 65660000000 HC RM CCU STEPDOWN

## 2020-01-31 PROCEDURE — 84100 ASSAY OF PHOSPHORUS: CPT

## 2020-01-31 PROCEDURE — 77010033678 HC OXYGEN DAILY

## 2020-01-31 PROCEDURE — 80048 BASIC METABOLIC PNL TOTAL CA: CPT

## 2020-01-31 PROCEDURE — 74011250636 HC RX REV CODE- 250/636: Performed by: INTERNAL MEDICINE

## 2020-01-31 PROCEDURE — 74011250637 HC RX REV CODE- 250/637: Performed by: INTERNAL MEDICINE

## 2020-01-31 PROCEDURE — 83735 ASSAY OF MAGNESIUM: CPT

## 2020-01-31 PROCEDURE — 94760 N-INVAS EAR/PLS OXIMETRY 1: CPT

## 2020-01-31 PROCEDURE — 36415 COLL VENOUS BLD VENIPUNCTURE: CPT

## 2020-01-31 RX ORDER — LANOLIN ALCOHOL/MO/W.PET/CERES
400 CREAM (GRAM) TOPICAL 2 TIMES DAILY
Status: COMPLETED | OUTPATIENT
Start: 2020-01-31 | End: 2020-01-31

## 2020-01-31 RX ORDER — LEVOFLOXACIN 250 MG/1
250 TABLET ORAL EVERY 24 HOURS
Status: DISCONTINUED | OUTPATIENT
Start: 2020-02-01 | End: 2020-02-01 | Stop reason: HOSPADM

## 2020-01-31 RX ORDER — METRONIDAZOLE 250 MG/1
500 TABLET ORAL EVERY 12 HOURS
Status: DISCONTINUED | OUTPATIENT
Start: 2020-01-31 | End: 2020-02-01 | Stop reason: HOSPADM

## 2020-01-31 RX ORDER — ACETAMINOPHEN 325 MG/1
650 TABLET ORAL
Status: DISCONTINUED | OUTPATIENT
Start: 2020-01-31 | End: 2020-02-01 | Stop reason: HOSPADM

## 2020-01-31 RX ORDER — MAGNESIUM SULFATE 1 G/100ML
1 INJECTION INTRAVENOUS ONCE
Status: DISCONTINUED | OUTPATIENT
Start: 2020-01-31 | End: 2020-01-31 | Stop reason: SDUPTHER

## 2020-01-31 RX ORDER — POLYETHYLENE GLYCOL 3350 17 G/17G
17 POWDER, FOR SOLUTION ORAL 2 TIMES DAILY
Status: DISCONTINUED | OUTPATIENT
Start: 2020-01-31 | End: 2020-02-01 | Stop reason: HOSPADM

## 2020-01-31 RX ADMIN — LEVOFLOXACIN 250 MG: 5 INJECTION, SOLUTION INTRAVENOUS at 10:51

## 2020-01-31 RX ADMIN — POLYETHYLENE GLYCOL 3350 17 G: 17 POWDER, FOR SOLUTION ORAL at 18:48

## 2020-01-31 RX ADMIN — METRONIDAZOLE 500 MG: 250 TABLET ORAL at 21:38

## 2020-01-31 RX ADMIN — ACETAMINOPHEN 650 MG: 325 TABLET ORAL at 10:02

## 2020-01-31 RX ADMIN — DIBASIC SODIUM PHOSPHATE, MONOBASIC POTASSIUM PHOSPHATE AND MONOBASIC SODIUM PHOSPHATE 1 TABLET: 852; 155; 130 TABLET ORAL at 18:48

## 2020-01-31 RX ADMIN — Medication 400 MG: at 18:48

## 2020-01-31 RX ADMIN — HEPARIN SODIUM 5000 UNITS: 5000 INJECTION INTRAVENOUS; SUBCUTANEOUS at 06:08

## 2020-01-31 RX ADMIN — METRONIDAZOLE 500 MG: 500 INJECTION, SOLUTION INTRAVENOUS at 09:44

## 2020-01-31 RX ADMIN — DIBASIC SODIUM PHOSPHATE, MONOBASIC POTASSIUM PHOSPHATE AND MONOBASIC SODIUM PHOSPHATE 1 TABLET: 852; 155; 130 TABLET ORAL at 12:26

## 2020-01-31 RX ADMIN — Medication 400 MG: at 12:26

## 2020-01-31 RX ADMIN — Medication 10 ML: at 18:49

## 2020-01-31 NOTE — ROUTINE PROCESS
Report given to [facility] [id number]: [id number] 
  
  
Significant changes during shift:   
Seen by CM. Diet advanced to clear liquids.  
  
  
  
Patient Information 
  
Ju Foots 82 y.o. 
1/27/2020  9:56 PM by Jamel Jean Baptiste, [address] admitted from Home 
  
Problem List 
  
       
Patient Active Problem List  
  Diagnosis Date Noted  Diarrhea 01/28/2020  
  
       
Past Medical History:  
Diagnosis Date  Arthritis    
 Perforated bowel (HCC)    
 Sarcoidosis    
  
  
  
Core Measures: 
  
CVA: No No 
CHF:No No 
PNA:No No 
  
Post Op Surgical (If Applicable):  
  
Number times ambulated in hallway past shift:  NO Number of times OOB to chair past shift:   NO 
NG Tube: No 
Incentive Spirometer: No 
Drains: No   Volume   
Dressing Present:  No 
Flatus:  Not applicable 
  
Activity Status: 
  
OOB to Chair No 
Ambulated this shift No  
Bed Rest Yes 
  
Supplemental Q6: (CT Applicable) 
  
NC No 
NRB No 
Venti-mask No 
On  Liters/min 
  
  
LINES AND DRAINS: 
  
Central Line? No  
  
PICC LINE? No  
  
Urinary Catheter? No  
  
DVT prophylaxis: 
  
DVT prophylaxis Med- Yes DVT prophylaxis SCD or MEL- Refused  
  
Wounds: (If Applicable) 
  
Wounds- No 
  
Location  
  
Patient Safety: 
  
Falls Score Total Score: 4 Safety Level_______ Bed Alarm On? Yes Sitter? No 
  
Plan for upcoming shift: advance diet tomorrow; possible d/c if tolerates well.  IV Abx 
  
  
Discharge Plan: probable home when stable 
  
Active Consults: 
IP CONSULT TO GENERAL SURGERY

## 2020-01-31 NOTE — PROGRESS NOTES
Problem: Risk for Spread of Infection  Goal: Prevent transmission of infectious organism to others  Description  Prevent the transmission of infectious organisms to other patients, staff members, and visitors. Outcome: Progressing Towards Goal     Problem: Patient Education:  Go to Education Activity  Goal: Patient/Family Education  Outcome: Progressing Towards Goal     Problem: Falls - Risk of  Goal: *Absence of Falls  Description  Document José Kramer Fall Risk and appropriate interventions in the flowsheet.   Outcome: Progressing Towards Goal  Note: Fall Risk Interventions:  Mobility Interventions: Bed/chair exit alarm, Communicate number of staff needed for ambulation/transfer, Patient to call before getting OOB    Mentation Interventions: Evaluate medications/consider consulting pharmacy    Medication Interventions: Evaluate medications/consider consulting pharmacy    Elimination Interventions: Patient to call for help with toileting needs, Stay With Me (per policy)    History of Falls Interventions: Bed/chair exit alarm         Problem: Patient Education: Go to Patient Education Activity  Goal: Patient/Family Education  Outcome: Progressing Towards Goal     Problem: Small Bowel Obstruction: Day 1  Goal: Off Pathway (Use only if patient is Off Pathway)  Outcome: Progressing Towards Goal  Goal: Activity/Safety  Outcome: Progressing Towards Goal  Goal: Consults, if ordered  Outcome: Progressing Towards Goal  Goal: Diagnostic Test/Procedures  Outcome: Progressing Towards Goal  Goal: Nutrition/Diet  Outcome: Progressing Towards Goal  Goal: Medications  Outcome: Progressing Towards Goal  Goal: Respiratory  Outcome: Progressing Towards Goal  Goal: Treatments/Interventions/Procedures  Outcome: Progressing Towards Goal  Goal: Psychosocial  Outcome: Progressing Towards Goal  Goal: *Optimal pain control at patient's stated goal  Outcome: Progressing Towards Goal  Goal: *Adequate urinary output (equal to or greater than 30 milliliters/hour)  Outcome: Progressing Towards Goal  Goal: *Hemodynamically stable  Outcome: Progressing Towards Goal  Goal: *Demonstrates progressive activity  Outcome: Progressing Towards Goal  Goal: *Absence of nausea/vomiting  Outcome: Progressing Towards Goal     Problem: Small Bowel Obstruction: Day 2  Goal: Off Pathway (Use only if patient is Off Pathway)  Outcome: Progressing Towards Goal  Goal: Activity/Safety  Outcome: Progressing Towards Goal  Goal: Consults, if ordered  Outcome: Progressing Towards Goal  Goal: Diagnostic Test/Procedures  Outcome: Progressing Towards Goal  Goal: Nutrition/Diet  Outcome: Progressing Towards Goal  Goal: Discharge Planning  Outcome: Progressing Towards Goal  Goal: Medications  Outcome: Progressing Towards Goal  Goal: Respiratory  Outcome: Progressing Towards Goal  Goal: Treatments/Interventions/Procedures  Outcome: Progressing Towards Goal  Goal: Psychosocial  Outcome: Progressing Towards Goal  Goal: *Optimal pain control at patient's stated goal  Outcome: Progressing Towards Goal  Goal: *Adequate urinary output (equal to or greater than 30 milliliters/hour)  Outcome: Progressing Towards Goal  Goal: *Hemodynamically stable  Outcome: Progressing Towards Goal  Goal: *Demonstrates progressive activity  Outcome: Progressing Towards Goal  Goal: *Absence of nausea/vomiting  Outcome: Progressing Towards Goal  Goal: *Return of normal bowel function  Outcome: Progressing Towards Goal     Problem: Small Bowel Obstruction: Day 3  Goal: Off Pathway (Use only if patient is Off Pathway)  Outcome: Progressing Towards Goal  Goal: Activity/Safety  Outcome: Progressing Towards Goal  Goal: Consults, if ordered  Outcome: Progressing Towards Goal  Goal: Diagnostic Test/Procedures  Outcome: Progressing Towards Goal  Goal: Nutrition/Diet  Outcome: Progressing Towards Goal  Goal: Discharge Planning  Outcome: Progressing Towards Goal  Goal: Medications  Outcome: Progressing Towards Goal  Goal: Respiratory  Outcome: Progressing Towards Goal  Goal: Treatments/Interventions/Procedures  Outcome: Progressing Towards Goal  Goal: Psychosocial  Outcome: Progressing Towards Goal  Goal: *Optimal pain control at patient's stated goal  Outcome: Progressing Towards Goal  Goal: *Adequate urinary output (equal to or greater than 30 milliliters/hour)  Outcome: Progressing Towards Goal  Goal: *Hemodynamically stable  Outcome: Progressing Towards Goal  Goal: *Adequate nutrition  Outcome: Progressing Towards Goal  Goal: *Demonstrates progressive activity  Outcome: Progressing Towards Goal  Goal: *Participates in discharge planning  Outcome: Progressing Towards Goal     Problem: Small Bowel Obstruction: Day 4 to Discharge  Goal: Off Pathway (Use only if patient is Off Pathway)  Outcome: Progressing Towards Goal  Goal: Activity/Safety  Outcome: Progressing Towards Goal  Goal: Nutrition/Diet  Outcome: Progressing Towards Goal  Goal: Discharge Planning  Outcome: Progressing Towards Goal  Goal: Medications  Outcome: Progressing Towards Goal  Goal: Respiratory  Outcome: Progressing Towards Goal  Goal: Treatments/Interventions/Procedures  Outcome: Progressing Towards Goal  Goal: Psychosocial  Outcome: Progressing Towards Goal     Problem: Small Bowel Obstruction - Non Surgical: Discharge Outcomes  Goal: *Hemodynamically stable  Outcome: Progressing Towards Goal  Goal: *Demonstrates independent activity or return to baseline  Outcome: Progressing Towards Goal  Goal: *Optimal pain control at patient's stated goal  Outcome: Progressing Towards Goal  Goal: *Verbalizes understanding and describes prescribed diet  Outcome: Progressing Towards Goal  Goal: *Tolerating diet  Outcome: Progressing Towards Goal  Goal: *Verbalizes name, dosage, time, side effects, and number of days to continue medications  Outcome: Progressing Towards Goal  Goal: *Anxiety reduced or absent  Outcome: Progressing Towards Goal  Goal: *Understands and describes signs and symptoms to report to providers(Stroke Metric)  Outcome: Progressing Towards Goal  Goal: *Describes follow-up/return visits to physicians  Outcome: Progressing Towards Goal  Goal: *Describes available resources and support systems  Outcome: Progressing Towards Goal  Goal: *Active bowel function  Outcome: Progressing Towards Goal

## 2020-01-31 NOTE — ROUTINE PROCESS
Report given to Joann Pinto XDYLI: [id number] 
  
  
Significant changes during shift:   
nonw.  
  
  
  
Patient Information 
  
Augustine Mcburney 82 y.o. 
1/27/2020  9:56 PM by Jamel Soares, [address] admitted from Home 
  
Problem List 
  
       
Patient Active Problem List  
  Diagnosis Date Noted  Diarrhea 01/28/2020  
  
       
Past Medical History:  
Diagnosis Date  Arthritis    
 Perforated bowel (HCC)    
 Sarcoidosis    
  
  
  
Core Measures: 
  
CVA: No No 
CHF:No No 
PNA:No No 
  
Post Op Surgical (If Applicable):  
  
Number times ambulated in hallway past shift:  NO Number of times OOB to chair past shift:   NO 
NG Tube: No 
Incentive Spirometer: No 
Drains: No   Volume   
Dressing Present:  No 
Flatus:  Not applicable 
  
Activity Status: 
  
OOB to Chair No 
Ambulated this shift No  
Bed Rest Yes 
  
Supplemental D4: (DB Applicable) 
  
NC No 
NRB No 
Venti-mask No 
On  Liters/min 
  
  
LINES AND DRAINS: 
  
Central Line? No  
  
PICC LINE? No  
  
Urinary Catheter? No  
  
DVT prophylaxis: 
  
DVT prophylaxis Med- Yes DVT prophylaxis SCD or MEL- Refused  
  
Wounds: (If Applicable) 
  
Wounds- No 
  
Location  
  
Patient Safety: 
  
Falls Score Total Score: 4 Safety Level_______ Bed Alarm On? Yes Sitter? No 
  
Plan for upcoming shift: advance diet tomorrow; possible d/c if tolerates well.  IV Abx 
  
  
Discharge Plan: probable home when stable 
  
Active Consults: 
IP CONSULT TO GENERAL SURGERY

## 2020-01-31 NOTE — PROGRESS NOTES
Nutrition Assessment:    INTERVENTIONS/RECOMMENDATIONS:   Advance diet as tolerated  Add Ensure clear TID     ASSESSMENT:   Chart reviewed; medically noted for enteritis. NGT removed and diet advanced to clear liquids. Patient reports tolerating liquids well. Requesting saha and an english muffin this morning. Reports a hx of weight loss but weight has been stable recently. She drinks ensure clear when she can find them in store; states they can be hard to locate. Will add ensure clear TID. Encouraged intake of meals. Diet advancement per surgeon. Diet Order: Clear liquids  % Eaten:  No data found. Pertinent Medications: [x] Reviewed []Other: D5% IVF  Pertinent Labs: [x]Reviewed  []Other: Mg 1.5, Phos 2.0  Food Allergies: [x]None []Yes:     Last BM: 1/28  []Active     []Hyperactive  []Hypoactive       [] Absent  BS  Skin:    [x] Intact   [] Incision  [] Breakdown   []Edema   []Other:    Anthropometrics: Height: 5' (152.4 cm) Weight: 39.5 kg (87 lb)    IBW (%IBW):   ( ) UBW (%UBW):   (  %)    BMI: Body mass index is 16.99 kg/m². This BMI is indicative of:  [x]Underweight   []Normal   []Overweight   [] Obesity   [] Extreme Obesity (BMI>40)  Last Weight Metrics:  Weight Loss Metrics 1/27/2020 5/13/2018   Today's Wt 87 lb 88 lb 10 oz   BMI 16.99 kg/m2 17.31 kg/m2       Estimated Nutrition Needs (Based on): 1316 Kcals/day(BMR (782) x 1.3AF +300kcal) , 52 g(1.3 g/kg bw) Protein  Carbohydrate:  At Least 130 g/day  Fluids: 1300 mL/day     Pt expected to meet estimated nutrient needs: [x]Yes []No    NUTRITION DIAGNOSES:   Problem:  Altered GI function      Etiology: related to enteritis, fluid fillled stomach and colon     Signs/Symptoms: as evidenced by imaging, slow diet advancement       NUTRITION INTERVENTIONS:  Meals/Snacks: General/healthful diet   Supplements: Commercial supplement              GOAL:   PO intake >50% of meals/supplement next 2-4 days    NUTRITION MONITORING AND EVALUATION   Food/Nutrient Intake Outcomes:  Total energy intake  Physical Signs/Symptoms Outcomes: Weight/weight change, GI profile, Electrolyte and renal profile    Previous Goal Met:   [x] Met              [] Progressing Towards Goal              [] Not Progressing Towards Goal   Previous Recommendations:   [x] Implemented          [] Not Implemented          [] Not Applicable    LEARNING NEEDS (Diet, Food/Nutrient-Drug Interaction):    [x] None Identified   [] Identified and Education Provided/Documented   [] Identified and Pt declined/was not appropriate     Cultural, Rastafarian, OR Ethnic Dietary Needs:    [x] None Identified   [] Identified and Addressed     [x] Interdisciplinary Care Plan Reviewed/Documented    [x] Discharge Planning: Regular diet    [] Participated in Interdisciplinary Rounds    NUTRITION RISK:    [x] Patient At Nutritional Risk             [] Patient Not At Nutritional Risk      [facility] 09  Pager [phone]    Weekend Pager [phone]

## 2020-01-31 NOTE — PROGRESS NOTES
[facility]:    [facility]     > 2nd IM prior to d/c    4:36 PM: CM received update that [facility] has accepted pt for services at d/c. CM contacted pt's daughter to inform her of update; pt's daughter would like to move forward with [facility]. CM called [facility] rep, Mau Aguilera ([phone]) to confirm f/u for d/c tomorrow; Tiffany Terry confirmed and reported she has already informed weekend liaison. Initial note: CM contacted pt's daughter Mac Mcclure: [phone]) to f/u regarding [facility] intervention. Pt's daughter reported she spoke with the pt and they are both agreeable to [facility] intervention at d/c. Pt's daughter requested for referrals to be sent to [place] [address] and [address] [place] [address] for review. CM met with pt at bedside to confirm she is open to [facility] intervention; pt confirmed and is agreeable to services. CM provided [facility] document and explained its purpose; signed copy placed on bedside chart. CM sent referrals via [employer] to [facility] ([place]) and [facility] services for review; referrals pending. CM will continue to follow patient for discharge planning needs and arrange for services as deemed necessary.     Mariely Ayon, MSW  Care Manager, [facility]

## 2020-01-31 NOTE — PROGRESS NOTES
Hospitalist Progress Note    NAME: Buster Tobar   :  1935   MRN:  [id number]       Assessment / Plan:  Colitis POA- presumed infectious  Causing abdominal pain with diarrhea and vomiting POA- improved now   Suspected Bowel obstruction - ruled out on CT in ER  Gastric Distention with ileus POA  Lactic Acidosis (2.1->3. 2) POA- resolved now at 1.2  CDiff test negative  CT abdomen was done and showed significantly dilated stomach  Lactate  Repeat KUB ()=   There are multiple air-fluid levels in the left hemiabdomen on decubitus radiography. No  pneumoperitoneum    Tolerated clear liquids yesterday as per Gen Surgery- will advance diet to GI lite today- see how she does on it  Pt initially refused NGT placement for evident Gastric Dilatation on CT in ER- convinced by Gen Surgery Dr Cirilo Rivera yesterday- s/p NGT placement on the Floor   IP Gen Surgery Consult appreciated-s/p Removal of NGT  by surgery  DCd enteric precautions due to C Diff as test neg  Cont empiric antibiotic therapy IV Flagyl and Levaquin for now  Cont D5 1/2 NS for 6 more hrs then stop today if eating well  Replenish Electrolytes again today -  PO phos BID today, IV Mag x 1 today  Check levels in AM    Ecoli UTI  POA  Cont IV antibiotic Levaquin for now   Follow-up urine culture- grew Garnica sensitive EColi     History of sarcoidosis  Patient currently not on any medication     Acute renal failure POA-likely pre renal due to dehydration, resolved now  S/p aggressive IVF in past 24 hrs  Cr now normalized at 0.9      less than 18.5 Underweight / Body mass index is 16.99 kg/m².    Nutrition consulted - recc appreciated    Code status: Full   Surrogate Decision Maker:Namrata Angeles (local), son is a pediatrician in [place], [facility] [phone]  Prophylaxis: Hep SQ  Recommended Disposition: Home w/Family with [facility] as recommended in 24 hrs - CM to keep arranged as per Daughter's choice for possible DC in 24 hrs     Subjective: Chief Complaint / Reason for Physician Visit :F/U ileus, Colitis, dyselectrolytemia, distended stomach  \"I want to eat soon\". Discussed with RN events overnight. Review of Systems:  Symptom Y/N Comments  Symptom Y/N Comments   Fever/Chills n   Chest Pain n    Poor Appetite n   Edema n    Cough n   Abdominal Pain n    Sputum n   Joint Pain n    SOB/HAYDEN n   Pruritis/Rash n    Nausea/vomit n   Tolerating PT/OT y    Diarrhea n   Tolerating Diet y clears   Constipation    Other       Could NOT obtain due to:      Objective:     VITALS:   Last 24hrs VS reviewed since prior progress note. Most recent are:  Patient Vitals for the past 24 hrs:   Temp Pulse Resp BP SpO2   01/31/20 0815 98.7 °F (37.1 °C) 80 16 135/82 94 %   01/31/20 0331 98.6 °F (37 °C) 72 16 114/78 100 %   01/30/20 2326 97.3 °F (36.3 °C) 73 18 155/89 98 %   01/30/20 1918 97.8 °F (36.6 °C) 74 16 154/89 100 %   01/30/20 1505 97.7 °F (36.5 °C) 71 20 142/81 100 %   01/30/20 1207 98 °F (36.7 °C) 76 24 141/89 96 %     No intake or output data in the 24 hours ending 01/31/20 0925     PHYSICAL EXAM:  General: WD, WN. Alert, cooperative, no acute distress, NGT out now   EENT:  EOMI. Anicteric sclerae. MMM  Resp:  CTA bilaterally, no wheezing or rales. No accessory muscle use  CV:  Regular  rhythm,  No edema  GI:  Soft, Non distended, Non tender.  +Bowel sounds  Neurologic:  Alert and oriented X 3, normal speech,   Psych:   Good insight. Not anxious nor agitated  Skin:  No rashes.   No jaundice    Reviewed most current lab test results and cultures  YES  Reviewed most current radiology test results   YES  Review and summation of old records today    NO  Reviewed patient's current orders and MAR    YES  PMH/SH reviewed - no change compared to H&P  ________________________________________________________________________  Care Plan discussed with:    Comments   Patient x    Family  x Son on phone   RN x    Care Manager x    Consultant                       x Multidiciplinary team rounds were held today with , nursing, pharmacist and clinical coordinator. Patient's plan of care was discussed; medications were reviewed and discharge planning was addressed. ________________________________________________________________________  Total NON critical care TIME:  26   Minutes    Total CRITICAL CARE TIME Spent:   Minutes non procedure based      Comments   >50% of visit spent in counseling and coordination of care     ________________________________________________________________________  Eusebio Bedolla MD     Procedures: see electronic medical records for all procedures/Xrays and details which were not copied into this note but were reviewed prior to creation of Plan. LABS:  I reviewed today's most current labs and imaging studies.   Pertinent labs include:  Recent Labs     01/31/20  0327 01/30/20  0342 01/29/20  0315   WBC 8.6 11.8* 11.2*   HGB 9.8* 9.0* 8.9*   HCT 30.3* 28.5* 28.3*    157 153     Recent Labs     01/31/20  0327 01/30/20  0342 01/29/20  0315    141 148*   K 3.5 3.4* 4.0   * 111* 120*   CO2 28 26 24   GLU 92 100 135*   BUN 5* 9 17   CREA 0.77 0.86 0.91   CA 6.8* 6.6* 6.9*   MG 1.5* 1.3*  --    PHOS 2.0* 1.5*  --        Signed: Eusebio Bedolla MD

## 2020-01-31 NOTE — PROGRESS NOTES
Surgical Note         Assessment :    Plan:  Patient Active Problem List   Diagnosis Code    Diarrhea R19.7      Pt stable  Had a headache yesterday. Got better after some coffee. Pt reports headache starting to come back now. Is&Os not documented. Don't know how much clears she tolerated. abd is benign. If she tolerates clears this morning would recommend advancing her diet. Ecoli UTI being treated    Ok to discharge from a surgery standpoint once she has a bowel movement. [facility] will see on request -         Subjective:     Chief Complaint:  Forgetful, pleasant. Woke up feeling fine, starting to get a HA again. Review of Systems:  Y  N       Y  N  [] [x]  Fever/chills                                               [] [x]  Chest Pain  [] [x]  Cough                                                       [] [x]  Diarrhea   [] [x]  Sputum                                                     [] [x]  Constipation  [] [x]  SOB/HAYDEN                                                [] [x]  Nausea/Vomit  [] [x]  Abd Pain                                                    [] []? Tolerating diet  [] [x]  Dysuria                                                           []Unable to obtain  ROS due to  []mental status change  []sedated   []intubated    Objective:     VITALS:   Last 24hrs VS reviewed since prior hospitalist progress note.  Most recent are:  Visit Vitals  /82   Pulse 80   Temp 98.7 °F (37.1 °C)   Resp 16   Ht 5' (1.524 m)   Wt 39.5 kg (87 lb)   SpO2 94%   Breastfeeding No   BMI 16.99 kg/m²     Temp (24hrs), Av °F (36.7 °C), Min:97.3 °F (36.3 °C), Max:98.7 °F (37.1 °C)    No intake or output data in the 24 hours ending 20 1006      []Mcclellan []NGT  []Intubated on vent    PHYSICAL EXAM:  Gen:  [] A&O  []non-toxic  [x] No acute distress             [] ill apearing  []  Critical        HEENT:   [x]NC/AT/PERRLA/EOMI    []pink conjunctivae      []pale conjunctivae PERRL  []yes  []no      []moist mucosa    []dry mucosa    hearing intact to voice []yes  []No    RESP:   [x]CTA bialterally/no wheezing/rhonchi/rales/crackles    []clear bilaterally  []wheezing   []rhonchi   []crackles     use of accessory muscles []yes []no    CARD:   [x] regular rate and rhythm/No murmurs/rubs/gallops    murmur  []yes ()  []no      Rubs  []yes  []no       Gallops []yes  []no    Rate [] regular  [] irregular        carotid bruits  []Right  [] Left                 LE edema []yes  []no           JVP  [] yes   [] no    ABD:    [x]soft  [x]non distended  [x]pigastric tenderness, chronic, unchanged.    [] NABS    SKIN:   Rashes [] yes   [x] no    Ulcers [] yes   [x] no               [x]normal   []tight to palpitation    skin turgor [] good  []poor  []decreased               Cyanosis/clubbing [] yes [x] no    NEUR:   [x]cranial nerves II-XII grossly intact       []Cranial nerves deficit                 [] facial droop    [] slurred speech   []aphasic     []Strength normal     [] weakness  [] LUE  []  RUE/ [] LLE  []  RLE    follows commands  [] yes [] no           PSYCH:   insight []poor [x]good   Alert and Oriented to  [x]person  [x]place  [x] time                    []depressed []anxious []agitated  []lethargic []stuporous  []sedated           Lab Data Reviewed: (see below)    Medications Reviewed: (see below)    PMH/SH reviewed - no change compared to H&P    Care Plan discussed with:  [x]Patient   []Family    []Care Manager   []RN    []Consultant/Specialist :    Prophylaxis:  []Lovenox  []Coumadin  []Hep SQ  []SCDs  []H2B/PPI    Disposition:  []Home w/ Family   []HH PT,OT,RN   []SNF/LTC   []SAH/Rehab    Ancillary Serices:   [] PT     []OT      []SW      []Nut      []HH ________________________________________________________________________  LABS:  Recent Labs     01/31/20 0327 01/30/20  0342   WBC 8.6 11.8*   HGB 9.8* 9.0*   HCT 30.3* 28.5*    157     Recent Labs     01/31/20  0327 01/30/20  0342 01/29/20  0315    141 148*   K 3.5 3.4* 4.0   * 111* 120*   CO2 28 26 24   BUN 5* 9 17   CREA 0.77 0.86 0.91   GLU 92 100 135*   CA 6.8* 6.6* 6.9*   MG 1.5* 1.3*  --    PHOS 2.0* 1.5*  --      No results for input(s): SGOT, GPT, ALT, AP, TBIL, TBILI, TP, ALB, GLOB, GGT, AML, LPSE in the last 72 hours. No lab exists for component: AMYP, HLPSE  No results for input(s): INR, PTP, APTT, INREXT in the last 72 hours. No results for input(s): FE, TIBC, PSAT, FERR in the last 72 hours. No results for input(s): PH, PCO2, PO2 in the last 72 hours. No results for input(s): CPK, CKMB in the last 72 hours.     No lab exists for component: TROPONINI  No results found for: GLUCPOC    MEDICATIONS:  Current Facility-Administered Medications   Medication Dose Route Frequency    magnesium sulfate 1 g/100 ml IVPB (premix or compounded)  1 g IntraVENous ONCE    phosphorus (K PHOS NEUTRAL) 250 mg tablet 1 Tab  1 Tab Oral BID    acetaminophen (TYLENOL) tablet 650 mg  650 mg Oral Q6H PRN    dextrose 5 % - 0.45% NaCl infusion  75 mL/hr IntraVENous CONTINUOUS    levoFLOXacin (LEVAQUIN) 250 mg in D5W IVPB  250 mg IntraVENous Q24H    sodium chloride (NS) flush 5-40 mL  5-40 mL IntraVENous Q8H    sodium chloride (NS) flush 5-40 mL  5-40 mL IntraVENous PRN    ondansetron (ZOFRAN) injection 4 mg  4 mg IntraVENous Q6H PRN    bisacodyL (DULCOLAX) suppository 10 mg  10 mg Rectal DAILY PRN    heparin (porcine) injection 5,000 Units  5,000 Units SubCUTAneous Q12H    lidocaine (XYLOCAINE) 2 % viscous solution 15 mL  15 mL Mouth/Throat PRN    metroNIDAZOLE (FLAGYL) IVPB premix 500 mg  500 mg IntraVENous Q12H    morphine injection 2 mg  2 mg IntraVENous Q4H PRN

## 2020-02-01 VITALS
TEMPERATURE: 97.6 F | RESPIRATION RATE: 18 BRPM | OXYGEN SATURATION: 95 % | WEIGHT: 87 LBS | HEART RATE: 87 BPM | HEIGHT: 60 IN | SYSTOLIC BLOOD PRESSURE: 132 MMHG | BODY MASS INDEX: 17.08 KG/M2 | DIASTOLIC BLOOD PRESSURE: 88 MMHG

## 2020-02-01 PROCEDURE — 94760 N-INVAS EAR/PLS OXIMETRY 1: CPT

## 2020-02-01 PROCEDURE — 74011250637 HC RX REV CODE- 250/637: Performed by: INTERNAL MEDICINE

## 2020-02-01 PROCEDURE — 74011250637 HC RX REV CODE- 250/637: Performed by: SURGERY

## 2020-02-01 RX ORDER — SAME BUTANEDISULFONATE/BETAINE 400-600 MG
250 POWDER IN PACKET (EA) ORAL 2 TIMES DAILY
Qty: 14 CAP | Refills: 0 | Status: SHIPPED | OUTPATIENT
Start: 2020-02-01 | End: 2020-02-08

## 2020-02-01 RX ORDER — METRONIDAZOLE 500 MG/1
500 TABLET ORAL EVERY 12 HOURS
Qty: 8 TAB | Refills: 0 | Status: SHIPPED | OUTPATIENT
Start: 2020-02-01 | End: 2020-02-05

## 2020-02-01 RX ORDER — LEVOFLOXACIN 250 MG/1
250 TABLET ORAL EVERY 24 HOURS
Qty: 3 TAB | Refills: 0 | Status: SHIPPED | OUTPATIENT
Start: 2020-02-02 | End: 2020-02-05

## 2020-02-01 RX ADMIN — DIBASIC SODIUM PHOSPHATE, MONOBASIC POTASSIUM PHOSPHATE AND MONOBASIC SODIUM PHOSPHATE 1 TABLET: 852; 155; 130 TABLET ORAL at 08:11

## 2020-02-01 RX ADMIN — METRONIDAZOLE 500 MG: 250 TABLET ORAL at 08:11

## 2020-02-01 RX ADMIN — POLYETHYLENE GLYCOL 3350 17 G: 17 POWDER, FOR SOLUTION ORAL at 08:11

## 2020-02-01 RX ADMIN — LEVOFLOXACIN 250 MG: 250 TABLET, FILM COATED ORAL at 08:11

## 2020-02-01 NOTE — PROGRESS NOTES
Problem: Risk for Spread of Infection  Goal: Prevent transmission of infectious organism to others  Description  Prevent the transmission of infectious organisms to other patients, staff members, and visitors. Outcome: Progressing Towards Goal     Problem: Patient Education:  Go to Education Activity  Goal: Patient/Family Education  Outcome: Progressing Towards Goal     Problem: Falls - Risk of  Goal: *Absence of Falls  Description  Document Denice Ceballos Fall Risk and appropriate interventions in the flowsheet.   Outcome: Progressing Towards Goal  Note: Fall Risk Interventions:  Mobility Interventions: Patient to call before getting OOB, Utilize walker, cane, or other assistive device, Bed/chair exit alarm    Mentation Interventions: Bed/chair exit alarm, Familiar objects from home, Reorient patient    Medication Interventions: Teach patient to arise slowly, Patient to call before getting OOB, Bed/chair exit alarm    Elimination Interventions: Call light in reach, Bed/chair exit alarm    History of Falls Interventions: Bed/chair exit alarm, Room close to nurse's station         Problem: Patient Education: Go to Patient Education Activity  Goal: Patient/Family Education  Outcome: Progressing Towards Goal     Problem: Small Bowel Obstruction: Day 1  Goal: Off Pathway (Use only if patient is Off Pathway)  Outcome: Progressing Towards Goal  Goal: Activity/Safety  Outcome: Progressing Towards Goal  Goal: Consults, if ordered  Outcome: Progressing Towards Goal  Goal: Diagnostic Test/Procedures  Outcome: Progressing Towards Goal  Goal: Nutrition/Diet  Outcome: Progressing Towards Goal  Goal: Medications  Outcome: Progressing Towards Goal  Goal: Respiratory  Outcome: Progressing Towards Goal  Goal: Treatments/Interventions/Procedures  Outcome: Progressing Towards Goal  Goal: Psychosocial  Outcome: Progressing Towards Goal  Goal: *Optimal pain control at patient's stated goal  Outcome: Progressing Towards Goal  Goal: *Adequate urinary output (equal to or greater than 30 milliliters/hour)  Outcome: Progressing Towards Goal  Goal: *Hemodynamically stable  Outcome: Progressing Towards Goal  Goal: *Demonstrates progressive activity  Outcome: Progressing Towards Goal  Goal: *Absence of nausea/vomiting  Outcome: Progressing Towards Goal     Problem: Small Bowel Obstruction: Day 2  Goal: Off Pathway (Use only if patient is Off Pathway)  Outcome: Progressing Towards Goal  Goal: Activity/Safety  Outcome: Progressing Towards Goal  Goal: Consults, if ordered  Outcome: Progressing Towards Goal  Goal: Diagnostic Test/Procedures  Outcome: Progressing Towards Goal  Goal: Nutrition/Diet  Outcome: Progressing Towards Goal  Goal: Discharge Planning  Outcome: Progressing Towards Goal  Goal: Medications  Outcome: Progressing Towards Goal  Goal: Respiratory  Outcome: Progressing Towards Goal  Goal: Treatments/Interventions/Procedures  Outcome: Progressing Towards Goal  Goal: Psychosocial  Outcome: Progressing Towards Goal  Goal: *Optimal pain control at patient's stated goal  Outcome: Progressing Towards Goal  Goal: *Adequate urinary output (equal to or greater than 30 milliliters/hour)  Outcome: Progressing Towards Goal  Goal: *Hemodynamically stable  Outcome: Progressing Towards Goal  Goal: *Demonstrates progressive activity  Outcome: Progressing Towards Goal  Goal: *Absence of nausea/vomiting  Outcome: Progressing Towards Goal  Goal: *Return of normal bowel function  Outcome: Progressing Towards Goal     Problem: Small Bowel Obstruction: Day 3  Goal: Off Pathway (Use only if patient is Off Pathway)  Outcome: Progressing Towards Goal  Goal: Activity/Safety  Outcome: Progressing Towards Goal  Goal: Consults, if ordered  Outcome: Progressing Towards Goal  Goal: Diagnostic Test/Procedures  Outcome: Progressing Towards Goal  Goal: Nutrition/Diet  Outcome: Progressing Towards Goal  Goal: Discharge Planning  Outcome: Progressing Towards Goal  Goal: Medications  Outcome: Progressing Towards Goal  Goal: Respiratory  Outcome: Progressing Towards Goal  Goal: Treatments/Interventions/Procedures  Outcome: Progressing Towards Goal  Goal: Psychosocial  Outcome: Progressing Towards Goal  Goal: *Optimal pain control at patient's stated goal  Outcome: Progressing Towards Goal  Goal: *Adequate urinary output (equal to or greater than 30 milliliters/hour)  Outcome: Progressing Towards Goal  Goal: *Hemodynamically stable  Outcome: Progressing Towards Goal  Goal: *Adequate nutrition  Outcome: Progressing Towards Goal  Goal: *Demonstrates progressive activity  Outcome: Progressing Towards Goal  Goal: *Participates in discharge planning  Outcome: Progressing Towards Goal     Problem: Small Bowel Obstruction: Day 4 to Discharge  Goal: Off Pathway (Use only if patient is Off Pathway)  Outcome: Progressing Towards Goal  Goal: Activity/Safety  Outcome: Progressing Towards Goal  Goal: Nutrition/Diet  Outcome: Progressing Towards Goal  Goal: Discharge Planning  Outcome: Progressing Towards Goal  Goal: Medications  Outcome: Progressing Towards Goal  Goal: Respiratory  Outcome: Progressing Towards Goal  Goal: Treatments/Interventions/Procedures  Outcome: Progressing Towards Goal  Goal: Psychosocial  Outcome: Progressing Towards Goal     Problem: Small Bowel Obstruction - Non Surgical: Discharge Outcomes  Goal: *Hemodynamically stable  Outcome: Progressing Towards Goal  Goal: *Demonstrates independent activity or return to baseline  Outcome: Progressing Towards Goal  Goal: *Optimal pain control at patient's stated goal  Outcome: Progressing Towards Goal  Goal: *Verbalizes understanding and describes prescribed diet  Outcome: Progressing Towards Goal  Goal: *Tolerating diet  Outcome: Progressing Towards Goal  Goal: *Verbalizes name, dosage, time, side effects, and number of days to continue medications  Outcome: Progressing Towards Goal  Goal: *Anxiety reduced or absent  Outcome: Progressing Towards Goal  Goal: *Understands and describes signs and symptoms to report to providers(Stroke Metric)  Outcome: Progressing Towards Goal  Goal: *Describes follow-up/return visits to physicians  Outcome: Progressing Towards Goal  Goal: *Describes available resources and support systems  Outcome: Progressing Towards Goal  Goal: *Active bowel function  Outcome: Progressing Towards Goal

## 2020-02-01 NOTE — PROGRESS NOTES
The patient is to be followed by MARIA M FONTANA [facility] for HHPT/SN. The on-call weekend liaison [facility] is aware that the patient is being discharged today, 2/1/20. [facility]. CM will continue to assist with dispo needs.

## 2020-02-01 NOTE — ROUTINE PROCESS
Report given to Radhafawn Pinto MKRSJ: [id number] 
  
  
Significant changes during shift:   
nonw.  
  
  
  
Patient Information 
  
Eder Perkins 82 y.o. 
1/27/2020  9:56 PM by Jamel Leal, [address] admitted from Home 
  
Problem List 
  
       
Patient Active Problem List  
  Diagnosis Date Noted  Diarrhea 01/28/2020  
  
       
Past Medical History:  
Diagnosis Date  Arthritis    
 Perforated bowel (HCC)    
 Sarcoidosis    
  
  
  
Core Measures: 
  
CVA: No No 
CHF:No No 
PNA:No No 
  
Post Op Surgical (If Applicable):  
  
Number times ambulated in hallway past shift:  NO Number of times OOB to chair past shift:   NO 
NG Tube: No 
Incentive Spirometer: No 
Drains: No   Volume   
Dressing Present:  No 
Flatus:  Not applicable 
  
Activity Status: 
  
OOB to Chair No 
Ambulated this shift No  
Bed Rest Yes 
  
Supplemental V4: (ZE Applicable) 
  
NC No 
NRB No 
Venti-mask No 
On  Liters/min 
  
  
LINES AND DRAINS: 
  
Central Line? No  
  
PICC LINE? No  
  
Urinary Catheter? No  
  
DVT prophylaxis: 
  
DVT prophylaxis Med- Yes DVT prophylaxis SCD or MEL- Refused  
  
Wounds: (If Applicable) 
  
Wounds- No 
  
Location  
  
Patient Safety: 
  
Falls Score Total Score: 4 Safety Level_______ Bed Alarm On? Yes Sitter? No 
  
Plan for upcoming shift: rest, safety - anticipate D/C with  on 2/1  
  
Discharge Plan: probable home when stable 
  
Active Consults: 
IP CONSULT TO GENERAL SURGERY

## 2020-02-01 NOTE — PROGRESS NOTES
Patient refused lab work stating her arm is sore and she is tired of being stuck. Patient also refused Heparin this morning again saying she refuses to be stuck again and it doesn't make sense since she is going home today.

## 2020-02-01 NOTE — PROGRESS NOTES
Medicare pt has received, reviewed, and signed 2nd IM letter informing them of their right to appeal the discharge. Signed copy has been placed on pt bedside chart.

## 2020-02-01 NOTE — PROGRESS NOTES
Problem: Risk for Spread of Infection  Goal: Prevent transmission of infectious organism to others  Description  Prevent the transmission of infectious organisms to other patients, staff members, and visitors. Outcome: Progressing Towards Goal     Problem: Falls - Risk of  Goal: *Absence of Falls  Description  Document José Kramer Fall Risk and appropriate interventions in the flowsheet.   Outcome: Progressing Towards Goal  Note: Fall Risk Interventions:  Mobility Interventions: Bed/chair exit alarm    Mentation Interventions: Bed/chair exit alarm    Medication Interventions: Bed/chair exit alarm    Elimination Interventions: Bed/chair exit alarm    History of Falls Interventions: Bed/chair exit alarm         Problem: Small Bowel Obstruction: Day 2  Goal: Activity/Safety  Outcome: Progressing Towards Goal

## 2020-02-01 NOTE — DISCHARGE INSTRUCTIONS
HOSPITALIST DISCHARGE INSTRUCTIONS    NAME: Camden Stinson   :  1935   MRN:  [id number]     Date/Time:  2020 8:41 AM    ADMIT DATE: 2020     DISCHARGE DATE: 2020     DISCHARGE DIAGNOSIS:  Colitis POA- presumed infectious, complete Levaquin & Flagyl x 5 days  Causing abdominal pain with diarrhea and vomiting POA- improved now , Probiotic  Suspected Bowel obstruction - ruled out on CT in ER  Gastric Distention with ileus POA- improved clinically, pt tolerating Po well x 48 hrs now, cleared by Gen Surgery to DC home with OP follow up with her Reg Surgeon in [facility]  Lactic Acidosis (2.1->3.2) POA- resolved now at 1.2  Ecoli UTI  POA- covered with levaquin as above  History of sarcoidosis  Acute renal failure POA-likely pre renal due to dehydration, resolved now  Full code    Active Problems:    Diarrhea (2020)         MEDICATIONS:  As per medication reconciliation  list  · It is important that you take the medication exactly as they are prescribed. · Keep your medication in the bottles provided by the pharmacist and keep a list of the medication names, dosages, and times to be taken in your wallet. · Do not take other medications without consulting your doctor. Pain Management: per above medications    What to do at Home    Recommended diet:  Regular Diet    Recommended activity: Activity as tolerated    If you have questions regarding the hospital related prescriptions or hospital related issues [facility] [phone]. If you experience any of the following symptoms then please call your primary care physician or return to the emergency room if you cannot get hold of your doctor:  Fever, chills, nausea, vomiting, diarrhea, change in mentation, falling, bleeding, shortness of breath,     Follow Up:   Your PCP Jayme veronica/surgeons in [facility] you are to call and set up an appointment to see them in 7-10 days.        Information obtained by :  I understand that if any problems occur once I am at home I am to contact my physician. I understand and acknowledge receipt of the instructions indicated above.                                                                                                                                            Physician's or R.N.'s Signature                                                                  Date/Time                                                                                                                                              Patient or Representative Signature                                                          Date/Time

## 2020-02-01 NOTE — DISCHARGE SUMMARY
Hospitalist Discharge Summary     Patient ID:  Augustine Mcburney  [id number]  82 y.o.  1935 1/27/2020    PCP on record: Unknown, Provider    Admit date: 1/27/2020  Discharge date and time: 2/1/2020    DISCHARGE DIAGNOSIS:    Colitis POA- presumed infectious, complete Levaquin & Flagyl x 5 days  Causing abdominal pain with diarrhea and vomiting POA- improved now , Probiotic  Suspected Bowel obstruction - ruled out on CT in ER  Gastric Distention with ileus POA- improved clinically, pt tolerating Po well x 48 hrs now, cleared by Gen Surgery to [place] home with OP follow up with her Reg Surgeon in [facility]  Lactic Acidosis (2.1->3. 2) POA- resolved now at 1.2  Ecoli UTI  POA- covered with levaquin as above  History of sarcoidosis  Acute renal failure POA-likely pre renal due to dehydration, resolved now  Full code      CONSULTATIONS:  IP CONSULT TO GENERAL SURGERY    Excerpted HPI from H&P of Jonny Caban MD:  \"82 years old female from home with past medical history significant for sarcoidosis, abdominal surgery presented to the hospital for evaluation of abdominal pain epigastric pain associated with nausea and vomiting started about 7 PM yesterday, patient denies any fever any chills denies any blood in the stool, CT abdomen was done and showed significantly dilated fluid-filled stomach with elevation of the left hemidiaphragm and mildly dilated fluid-filled colon suggest diarrhea no definite obstruction.     We were asked to admit for work up and evaluation of the above problems. \"    ______________________________________________________________________  DISCHARGE SUMMARY/HOSPITAL COURSE:  for full details see H&P, daily progress notes, labs, consult notes. Colitis POA- presumed infectious  Causing abdominal pain with diarrhea and vomiting POA- improved now   Suspected Bowel obstruction - ruled out on CT in ER  Gastric Distention with ileus POA  Lactic Acidosis (2.1->3. 2) POA- resolved now at 1.2  CDiff test negative  CT abdomen was done and showed significantly dilated stomach  Lactate  Repeat KUB (1/29)=   There are multiple air-fluid levels in the left hemiabdomen on decubitus radiography. No  pneumoperitoneum     Tolerated clear liquids yesterday as per Gen Surgery- will advance diet to GI lite today- see how she does on it  Pt initially refused NGT placement for evident Gastric Dilatation on CT in ER- convinced by Gen Surgery Dr Sean Walters yesterday- s/p NGT placement on the Floor 1/28  IP Gen Surgery Consult appreciated-s/p Removal of NGT 1/29 by surgery  DCd enteric precautions due to C Diff as test neg  Cont empiric antibiotic therapy IV Flagyl and Levaquin for now  Cont D5 1/2 NS for 6 more hrs then stop today if eating well  Replenish Electrolytes again today -  PO phos BID today, IV Mag x 1 today  Check levels in AM     Ecoli UTI  POA  Cont IV antibiotic Levaquin for now   Follow-up urine culture- grew Garnica sensitive EColi     History of sarcoidosis  Patient currently not on any medication     Acute renal failure POA-likely pre renal due to dehydration, resolved now  S/p aggressive IVF in past 24 hrs  Cr now normalized at 0.9        less than 18.5 Underweight / Body mass index is 16.99 kg/m². Nutrition consulted - recc appreciated     Code status: Full   Surrogate Decision Maker:Namrata Angeles (local), son is a pediatrician in [place], Jewel Demarco [phone]        _______________________________________________________________________  Patient seen and examined by me on discharge day. Pertinent Findings:  Gen:    Not in distress  Chest: Clear lungs  CVS:   Regular rhythm.   No edema  Abd:  Soft, not distended, not tender  Neuro:  Alert, oriented x 3  _______________________________________________________________________  DISCHARGE MEDICATIONS:   Current Discharge Medication List      START taking these medications    Details   levoFLOXacin (LEVAQUIN) 250 mg tablet Take 1 Tab by mouth every twenty-four (24) hours for 3 days. Qty: 3 Tab, Refills: 0      metroNIDAZOLE (FLAGYL) 500 mg tablet Take 1 Tab by mouth every twelve (12) hours for 4 days. Qty: 8 Tab, Refills: 0      Saccharomyces boulardii (FLORASTOR) 250 mg capsule Take 1 Cap by mouth two (2) times a day for 7 days. Qty: 14 Cap, Refills: 0         CONTINUE these medications which have NOT CHANGED    Details   HYDROcodone-acetaminophen (NORCO) 5-325 mg per tablet Take 1 Tab by mouth every four (4) hours as needed for Pain. Max Daily Amount: 6 Tabs. Qty: 12 Tab, Refills: 0    Associated Diagnoses: Hip arthritis; Left hip pain               Patient Follow Up Instructions: Activity: Activity as tolerated  Diet: Regular Diet      Follow-up with PCP in 1 week. Follow-up Information     Follow up With Specialties Details Why Contact [facility] Deleon on 2/19/2020 at 2 PM for PCP post hospital follow up appt. [address] [facility] [address]  [phone]    [facility] In-Home Clinical Assessments   [facility]   [email or web address]  [place]  [phone]    Unknown, Provider    Patient not available to ask          ________________________________________________________________    Risk of deterioration: Low    Condition at Discharge:  Stable  __________________________________________________________________    Disposition  Home with family and home health services    ____________________________________________________________________    Code Status: Full Code  ___________________________________________________________________      Total time in minutes spent coordinating this discharge (includes going over instructions, follow-up, prescriptions, and preparing report for sign off to her PCP) :  36 minutes    Signed:  Sudhakar Maldonado MD

## 2021-07-29 ENCOUNTER — APPOINTMENT (OUTPATIENT)
Dept: CT IMAGING | Age: 86
End: 2021-07-29
Attending: PHYSICIAN ASSISTANT
Payer: MEDICARE

## 2021-07-29 VITALS
HEART RATE: 88 BPM | HEIGHT: 62 IN | DIASTOLIC BLOOD PRESSURE: 89 MMHG | TEMPERATURE: 98.6 F | BODY MASS INDEX: 14.85 KG/M2 | RESPIRATION RATE: 18 BRPM | WEIGHT: 80.69 LBS | OXYGEN SATURATION: 96 % | SYSTOLIC BLOOD PRESSURE: 128 MMHG

## 2021-07-29 PROCEDURE — 72125 CT NECK SPINE W/O DYE: CPT

## 2021-07-29 PROCEDURE — 70450 CT HEAD/BRAIN W/O DYE: CPT

## 2021-07-29 PROCEDURE — 75810000275 HC EMERGENCY DEPT VISIT NO LEVEL OF CARE

## 2021-07-29 RX ORDER — BUTALBITAL, ACETAMINOPHEN AND CAFFEINE 50; 325; 40 MG/1; MG/1; MG/1
2 TABLET ORAL
Status: DISCONTINUED | OUTPATIENT
Start: 2021-07-29 | End: 2021-07-30

## 2021-07-30 ENCOUNTER — HOSPITAL ENCOUNTER (EMERGENCY)
Age: 86
Discharge: LEFT BEFORE DISCHARGE | End: 2021-07-30
Attending: EMERGENCY MEDICINE
Payer: MEDICARE

## 2021-07-30 DIAGNOSIS — Z53.21 PATIENT LEFT WITHOUT BEING SEEN: Primary | ICD-10-CM

## 2022-03-19 PROBLEM — R19.7 DIARRHEA: Status: ACTIVE | Noted: 2020-01-28

## 2023-05-17 RX ORDER — HYDROCODONE BITARTRATE AND ACETAMINOPHEN 5; 325 MG/1; MG/1
1 TABLET ORAL EVERY 4 HOURS PRN
COMMUNITY
Start: 2018-05-13

## 2024-03-20 NOTE — PROGRESS NOTES
PHYSICAL THERAPY EVALUATION/DISCHARGE  Patient: Sridevi Decker (16 y.o. female)  Date: 1/29/2020  Primary Diagnosis: Diarrhea [R19.7]       Precautions:          ASSESSMENT  Based on the objective data described below, the patient presents with HAYDEN however good strength, intact balance, and overall baseline independent functional mobility. Pt received supine in bed on RA with O2 sats 85% at rest. Instructed pt in pursed lip breathing however O2 sats 86-87% therefore re-applied 2L O2 for remainder of therapy session. Pt ambulated 120ft at supervision level, exhibiting decreased gait speed however steady gait overall. No LOB/balance deficits noted. Balance remained intact as pt independently performed dynamic reaching activities, including retrieving item from ground level as well as opening/closing low drawers. O2 sats 94% post activity on 2L O2. Pt functioning at her baseline independent level and has no further skilled therapy needs in the acute care setting. However, recommend HHPT for home safety evaluation. Functional Outcome Measure: The patient scored 75/100 on the Barthel Index outcome measure which is indicative of mild impairment in ADLs and functional mobility. Other factors to consider for discharge: lives alone     Further skilled acute physical therapy is not indicated at this time. PLAN :  Recommendation for discharge: (in order for the patient to meet his/her long term goals)  Physical therapy at least 2 days/week in the home - home safety evaluation    This discharge recommendation:  Has been made in collaboration with the attending provider and/or case management    IF patient discharges home will need the following DME: none       SUBJECTIVE:   Patient stated I visit [place] often.     OBJECTIVE DATA SUMMARY:   HISTORY:    Past Medical History:   Diagnosis Date    Arthritis     Perforated bowel ([address]     Sarcoidosis      Past Surgical History:   Procedure Laterality Date    HX Pt provided with warm blanket, pt states no other needs at this time.    HERNIA REPAIR         Prior level of function: Independent w/ ambulation and ADLs. Still driving. Denies home O2 use. Lives alone however states her daughters check on her daily. History of 1-2 falls. Personal factors and/or comorbidities impacting plan of care:     Home Situation  Home Environment: Private residence  # Steps to Enter: 3  Rails to Enter: Yes  Hand Rails : Bilateral  One/Two Story Residence: One story  Living Alone: Yes  Support Systems: Child(radhika)  Patient Expects to be Discharged to[de-identified] Private residence  Current DME Used/Available at Home: Walker, rolling  Tub or Shower Type: Tub/Shower combination    EXAMINATION/PRESENTATION/DECISION MAKING:   Critical Behavior:  Neurologic State: Alert  Orientation Level: Disoriented to situation  Cognition: Follows commands     Hearing: Auditory  Auditory Impairment: None  Skin:  intact  Edema: none noted   Range Of Motion:  AROM: Within functional limits                       Strength:    Strength:  Within functional limits                    Tone & Sensation:   Tone: Normal              Sensation: Intact               Coordination:  Coordination: Within functional limits  Vision:      Functional Mobility:  Bed Mobility:  Rolling: Independent  Supine to Sit: Independent     Scooting: Independent  Transfers:  Sit to Stand: Modified independent  Stand to Sit: Modified independent        Bed to Chair: Supervision              Balance:   Sitting: Intact  Standing: Intact  Ambulation/Gait Training:  Distance (ft): 120 Feet (ft)  Assistive Device: Gait belt  Ambulation - Level of Assistance: Supervision        Gait Abnormalities: Decreased step clearance        Base of Support: Narrowed     Speed/Cristina: Pace decreased (<100 feet/min)                         Functional Measure:  Barthel Index:    Bathin  Bladder: 5  Bowels: 10  Groomin  Dressing: 10  Feedin(pt is NPO)  Mobility: 15  Stairs: 5  Toilet Use: 10  Transfer (Bed to Chair and Back): 10  Total: 75/100       The Barthel ADL Index: Guidelines  1. The index should be used as a record of what a patient does, not as a record of what a patient could do. 2. The main aim is to establish degree of independence from any help, physical or verbal, however minor and for whatever reason. 3. The need for supervision renders the patient not independent. 4. A patient's performance should be established using the best available evidence. Asking the patient, friends/relatives and nurses are the usual sources, but direct observation and common sense are also important. However direct testing is not needed. 5. Usually the patient's performance over the preceding 24-48 hours is important, but occasionally longer periods will be relevant. 6. Middle categories imply that the patient supplies over 50 per cent of the effort. 7. Use of aids to be independent is allowed. Herman Mead., Barthel, D.W. (1859). Functional evaluation: the Barthel Index. [facility] (14)2. KenyaDANAY Askew, [facility] Anabel Grimaldo., [place], [address] [place] [address] (1999). Measuring the change indisability after inpatient rehabilitation; comparison of the responsiveness of the Barthel Index and Functional [place] Measure. Journal of Neurology, Neurosurgery, and Psychiatry, 66(4), 551-132. Zackary Beauchamp, N.J.A, SURYA Yoder, & Dahlia Mclean M.A. (2004.) Assessment of post-stroke quality of life in cost-effectiveness studies: The usefulness of the Barthel Index and the EuroQoL-5D.  Quality of Life Research, 15, 817-85          Physical Therapy Evaluation Charge Determination   History Examination Presentation Decision-Making   MEDIUM  Complexity : 1-2 comorbidities / personal factors will impact the outcome/ POC  MEDIUM Complexity : 3 Standardized tests and measures addressing body structure, function, activity limitation and / or participation in recreation  MEDIUM Complexity : Evolving with changing characteristics  MEDIUM Complexity : FOTO score of 26-74      Based on the above components, the patient evaluation is determined to be of the following complexity level: MEDIUM    Pain Rating:  Denied complaints of pain    Activity Tolerance:   O2 sats 85% on RA, reapplied 2L O2 with O2 sats 94% post activity  Please refer to the flowsheet for vital signs taken during this treatment. After treatment patient left in no apparent distress:   Sitting in chair and Call bell within reach    COMMUNICATION/EDUCATION:   The patients plan of care was discussed with: Occupational Therapist, Registered Nurse and . Fall prevention education was provided and the patient/caregiver indicated understanding., Patient/family have participated as able in goal setting and plan of care. and Patient/family agree to work toward stated goals and plan of care.     Thank you for this referral.  Willena Peabody, PT, DPT   Time Calculation: 24 mins